# Patient Record
Sex: FEMALE | Race: WHITE | NOT HISPANIC OR LATINO | Employment: UNEMPLOYED | URBAN - METROPOLITAN AREA
[De-identification: names, ages, dates, MRNs, and addresses within clinical notes are randomized per-mention and may not be internally consistent; named-entity substitution may affect disease eponyms.]

---

## 2024-09-29 ENCOUNTER — APPOINTMENT (EMERGENCY)
Dept: CT IMAGING | Facility: HOSPITAL | Age: 39
DRG: 867 | End: 2024-09-29
Payer: COMMERCIAL

## 2024-09-29 ENCOUNTER — HOSPITAL ENCOUNTER (INPATIENT)
Facility: HOSPITAL | Age: 39
LOS: 3 days | Discharge: HOME | DRG: 867 | End: 2024-10-02
Attending: EMERGENCY MEDICINE | Admitting: INTERNAL MEDICINE
Payer: COMMERCIAL

## 2024-09-29 ENCOUNTER — APPOINTMENT (EMERGENCY)
Dept: RADIOLOGY | Facility: HOSPITAL | Age: 39
DRG: 867 | End: 2024-09-29
Payer: COMMERCIAL

## 2024-09-29 DIAGNOSIS — R09.02 HYPOXEMIA REQUIRING SUPPLEMENTAL OXYGEN: ICD-10-CM

## 2024-09-29 DIAGNOSIS — J40 BRONCHITIS: ICD-10-CM

## 2024-09-29 DIAGNOSIS — R07.9 CHEST PAIN, UNSPECIFIED: Primary | ICD-10-CM

## 2024-09-29 DIAGNOSIS — R19.7 DIARRHEA: ICD-10-CM

## 2024-09-29 DIAGNOSIS — J96.01 ACUTE RESPIRATORY FAILURE WITH HYPOXIA (HCC): ICD-10-CM

## 2024-09-29 DIAGNOSIS — Z99.81 HYPOXEMIA REQUIRING SUPPLEMENTAL OXYGEN: ICD-10-CM
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
## 2024-09-29 DIAGNOSIS — J18.9 PNEUMONIA: ICD-10-CM

## 2024-09-29 DIAGNOSIS — R09.02 HYPOXIA: ICD-10-CM

## 2024-09-29 DIAGNOSIS — A69.20 ACUTE LYME DISEASE: ICD-10-CM

## 2024-09-29 DIAGNOSIS — A69.20 LYME DISEASE: ICD-10-CM

## 2024-09-29 PROBLEM — E16.2 HYPOGLYCEMIA: Status: ACTIVE | Noted: 2024-09-29

## 2024-09-29 PROBLEM — E87.6 HYPOKALEMIA: Status: ACTIVE | Noted: 2024-09-29

## 2024-09-29 PROBLEM — F10.20 CONTINUOUS CHRONIC ALCOHOLISM (HCC): Chronic | Status: ACTIVE | Noted: 2024-09-29

## 2024-09-29 PROBLEM — Z72.0 TOBACCO ABUSE: Status: ACTIVE | Noted: 2024-09-29

## 2024-09-29 LAB
2HR DELTA HS TROPONIN: -3 NG/L
4HR DELTA HS TROPONIN: -6 NG/L
ALBUMIN SERPL BCG-MCNC: 3.8 G/DL (ref 3.5–5)
ALP SERPL-CCNC: 56 U/L (ref 34–104)
ALT SERPL W P-5'-P-CCNC: 44 U/L (ref 7–52)
ANION GAP SERPL CALCULATED.3IONS-SCNC: 14 MMOL/L (ref 4–13)
AST SERPL W P-5'-P-CCNC: 64 U/L (ref 13–39)
ATRIAL RATE: 98 BPM
BASOPHILS # BLD AUTO: 0.02 THOUSANDS/ÂΜL (ref 0–0.1)
BASOPHILS NFR BLD AUTO: 0 % (ref 0–1)
BILIRUB SERPL-MCNC: 0.49 MG/DL (ref 0.2–1)
BNP SERPL-MCNC: 65 PG/ML (ref 0–100)
BUN SERPL-MCNC: 9 MG/DL (ref 5–25)
CALCIUM SERPL-MCNC: 8.1 MG/DL (ref 8.4–10.2)
CARDIAC TROPONIN I PNL SERPL HS: 11 NG/L
CARDIAC TROPONIN I PNL SERPL HS: 14 NG/L
CARDIAC TROPONIN I PNL SERPL HS: 17 NG/L
CHLORIDE SERPL-SCNC: 102 MMOL/L (ref 96–108)
CO2 SERPL-SCNC: 23 MMOL/L (ref 21–32)
CREAT SERPL-MCNC: 0.45 MG/DL (ref 0.6–1.3)
D DIMER PPP FEU-MCNC: 0.83 UG/ML FEU
EOSINOPHIL # BLD AUTO: 0 THOUSAND/ÂΜL (ref 0–0.61)
EOSINOPHIL NFR BLD AUTO: 0 % (ref 0–6)
ERYTHROCYTE [DISTWIDTH] IN BLOOD BY AUTOMATED COUNT: 12.5 % (ref 11.6–15.1)
FLUAV AG UPPER RESP QL IA.RAPID: NEGATIVE
FLUBV AG UPPER RESP QL IA.RAPID: NEGATIVE
GFR SERPL CREATININE-BSD FRML MDRD: 125 ML/MIN/1.73SQ M
GLUCOSE SERPL-MCNC: 115 MG/DL (ref 65–140)
GLUCOSE SERPL-MCNC: 65 MG/DL (ref 65–140)
HCG SERPL QL: NEGATIVE
HCT VFR BLD AUTO: 40.4 % (ref 34.8–46.1)
HGB BLD-MCNC: 13.6 G/DL (ref 11.5–15.4)
IMM GRANULOCYTES # BLD AUTO: 0.02 THOUSAND/UL (ref 0–0.2)
IMM GRANULOCYTES NFR BLD AUTO: 0 % (ref 0–2)
LYMPHOCYTES # BLD AUTO: 0.76 THOUSANDS/ÂΜL (ref 0.6–4.47)
LYMPHOCYTES NFR BLD AUTO: 12 % (ref 14–44)
MCH RBC QN AUTO: 31.7 PG (ref 26.8–34.3)
MCHC RBC AUTO-ENTMCNC: 33.7 G/DL (ref 31.4–37.4)
MCV RBC AUTO: 94 FL (ref 82–98)
MONOCYTES # BLD AUTO: 0.57 THOUSAND/ÂΜL (ref 0.17–1.22)
MONOCYTES NFR BLD AUTO: 9 % (ref 4–12)
NEUTROPHILS # BLD AUTO: 5.26 THOUSANDS/ÂΜL (ref 1.85–7.62)
NEUTS SEG NFR BLD AUTO: 79 % (ref 43–75)
NRBC BLD AUTO-RTO: 0 /100 WBCS
P AXIS: 76 DEGREES
PLATELET # BLD AUTO: 166 THOUSANDS/UL (ref 149–390)
PMV BLD AUTO: 8.8 FL (ref 8.9–12.7)
POTASSIUM SERPL-SCNC: 3.4 MMOL/L (ref 3.5–5.3)
PR INTERVAL: 134 MS
PROT SERPL-MCNC: 6.8 G/DL (ref 6.4–8.4)
QRS AXIS: 47 DEGREES
QRSD INTERVAL: 82 MS
QT INTERVAL: 354 MS
QTC INTERVAL: 451 MS
RBC # BLD AUTO: 4.29 MILLION/UL (ref 3.81–5.12)
SARS-COV+SARS-COV-2 AG RESP QL IA.RAPID: NEGATIVE
SODIUM SERPL-SCNC: 139 MMOL/L (ref 135–147)
T WAVE AXIS: 28 DEGREES
VENTRICULAR RATE: 98 BPM
WBC # BLD AUTO: 6.63 THOUSAND/UL (ref 4.31–10.16)

## 2024-09-29 PROCEDURE — 71275 CT ANGIOGRAPHY CHEST: CPT

## 2024-09-29 PROCEDURE — 87804 INFLUENZA ASSAY W/OPTIC: CPT | Performed by: EMERGENCY MEDICINE

## 2024-09-29 PROCEDURE — 36415 COLL VENOUS BLD VENIPUNCTURE: CPT | Performed by: EMERGENCY MEDICINE

## 2024-09-29 PROCEDURE — 84484 ASSAY OF TROPONIN QUANT: CPT | Performed by: EMERGENCY MEDICINE

## 2024-09-29 PROCEDURE — 84703 CHORIONIC GONADOTROPIN ASSAY: CPT | Performed by: EMERGENCY MEDICINE

## 2024-09-29 PROCEDURE — 99285 EMERGENCY DEPT VISIT HI MDM: CPT | Performed by: EMERGENCY MEDICINE

## 2024-09-29 PROCEDURE — 93005 ELECTROCARDIOGRAM TRACING: CPT

## 2024-09-29 PROCEDURE — 86618 LYME DISEASE ANTIBODY: CPT | Performed by: EMERGENCY MEDICINE

## 2024-09-29 PROCEDURE — 85379 FIBRIN DEGRADATION QUANT: CPT | Performed by: EMERGENCY MEDICINE

## 2024-09-29 PROCEDURE — 96365 THER/PROPH/DIAG IV INF INIT: CPT

## 2024-09-29 PROCEDURE — 99285 EMERGENCY DEPT VISIT HI MDM: CPT

## 2024-09-29 PROCEDURE — 93010 ELECTROCARDIOGRAM REPORT: CPT | Performed by: INTERNAL MEDICINE

## 2024-09-29 PROCEDURE — 86617 LYME DISEASE ANTIBODY: CPT | Performed by: EMERGENCY MEDICINE

## 2024-09-29 PROCEDURE — 99223 1ST HOSP IP/OBS HIGH 75: CPT | Performed by: INTERNAL MEDICINE

## 2024-09-29 PROCEDURE — 80053 COMPREHEN METABOLIC PANEL: CPT | Performed by: EMERGENCY MEDICINE

## 2024-09-29 PROCEDURE — 71046 X-RAY EXAM CHEST 2 VIEWS: CPT

## 2024-09-29 PROCEDURE — 85025 COMPLETE CBC W/AUTO DIFF WBC: CPT | Performed by: EMERGENCY MEDICINE

## 2024-09-29 PROCEDURE — 87040 BLOOD CULTURE FOR BACTERIA: CPT | Performed by: INTERNAL MEDICINE

## 2024-09-29 PROCEDURE — 82948 REAGENT STRIP/BLOOD GLUCOSE: CPT

## 2024-09-29 PROCEDURE — 87811 SARS-COV-2 COVID19 W/OPTIC: CPT | Performed by: EMERGENCY MEDICINE

## 2024-09-29 PROCEDURE — 83880 ASSAY OF NATRIURETIC PEPTIDE: CPT | Performed by: EMERGENCY MEDICINE

## 2024-09-29 RX ORDER — MAGNESIUM SULFATE HEPTAHYDRATE 40 MG/ML
2 INJECTION, SOLUTION INTRAVENOUS ONCE
Status: COMPLETED | OUTPATIENT
Start: 2024-09-29 | End: 2024-09-30

## 2024-09-29 RX ORDER — FOLIC ACID 1 MG/1
1 TABLET ORAL DAILY
Status: DISCONTINUED | OUTPATIENT
Start: 2024-09-30 | End: 2024-10-02 | Stop reason: HOSPADM

## 2024-09-29 RX ORDER — BENZONATATE 100 MG/1
100 CAPSULE ORAL 3 TIMES DAILY PRN
Status: DISCONTINUED | OUTPATIENT
Start: 2024-09-29 | End: 2024-10-02 | Stop reason: HOSPADM

## 2024-09-29 RX ORDER — LORAZEPAM 2 MG/ML
2 INJECTION INTRAMUSCULAR EVERY 4 HOURS PRN
Status: DISCONTINUED | OUTPATIENT
Start: 2024-09-29 | End: 2024-10-02 | Stop reason: HOSPADM

## 2024-09-29 RX ORDER — CHLORDIAZEPOXIDE HYDROCHLORIDE 25 MG/1
25 CAPSULE, GELATIN COATED ORAL EVERY 6 HOURS SCHEDULED
Status: DISCONTINUED | OUTPATIENT
Start: 2024-09-29 | End: 2024-10-02 | Stop reason: HOSPADM

## 2024-09-29 RX ORDER — LANOLIN ALCOHOL/MO/W.PET/CERES
100 CREAM (GRAM) TOPICAL DAILY
Status: DISCONTINUED | OUTPATIENT
Start: 2024-09-30 | End: 2024-10-02 | Stop reason: HOSPADM

## 2024-09-29 RX ORDER — MAGNESIUM HYDROXIDE/ALUMINUM HYDROXICE/SIMETHICONE 120; 1200; 1200 MG/30ML; MG/30ML; MG/30ML
30 SUSPENSION ORAL ONCE
Status: COMPLETED | OUTPATIENT
Start: 2024-09-29 | End: 2024-09-29

## 2024-09-29 RX ORDER — ONDANSETRON 2 MG/ML
1 INJECTION INTRAMUSCULAR; INTRAVENOUS ONCE
Status: COMPLETED | OUTPATIENT
Start: 2024-09-29 | End: 2024-09-29

## 2024-09-29 RX ORDER — DEXTROSE MONOHYDRATE, SODIUM CHLORIDE, AND POTASSIUM CHLORIDE 50; 2.98; 4.5 G/1000ML; G/1000ML; G/1000ML
125 INJECTION, SOLUTION INTRAVENOUS CONTINUOUS
Status: DISPENSED | OUTPATIENT
Start: 2024-09-29 | End: 2024-09-30

## 2024-09-29 RX ORDER — SODIUM CHLORIDE AND POTASSIUM CHLORIDE 300; 900 MG/100ML; MG/100ML
125 INJECTION, SOLUTION INTRAVENOUS CONTINUOUS
Status: DISCONTINUED | OUTPATIENT
Start: 2024-09-29 | End: 2024-09-29

## 2024-09-29 RX ORDER — GUAIFENESIN 600 MG/1
600 TABLET, EXTENDED RELEASE ORAL EVERY 12 HOURS SCHEDULED
Status: DISCONTINUED | OUTPATIENT
Start: 2024-09-29 | End: 2024-10-02 | Stop reason: HOSPADM

## 2024-09-29 RX ORDER — LORAZEPAM 1 MG/1
1 TABLET ORAL ONCE
Status: COMPLETED | OUTPATIENT
Start: 2024-09-29 | End: 2024-09-29

## 2024-09-29 RX ORDER — MAGNESIUM HYDROXIDE/ALUMINUM HYDROXICE/SIMETHICONE 120; 1200; 1200 MG/30ML; MG/30ML; MG/30ML
30 SUSPENSION ORAL EVERY 4 HOURS PRN
Status: DISCONTINUED | OUTPATIENT
Start: 2024-09-29 | End: 2024-10-01

## 2024-09-29 RX ORDER — ALBUTEROL SULFATE 0.83 MG/ML
2.5 SOLUTION RESPIRATORY (INHALATION) EVERY 4 HOURS PRN
Status: DISCONTINUED | OUTPATIENT
Start: 2024-09-29 | End: 2024-10-02 | Stop reason: HOSPADM

## 2024-09-29 RX ADMIN — GUAIFENESIN 600 MG: 600 TABLET ORAL at 21:35

## 2024-09-29 RX ADMIN — CEFTRIAXONE SODIUM 1000 MG: 10 INJECTION, POWDER, FOR SOLUTION INTRAVENOUS at 21:07

## 2024-09-29 RX ADMIN — MAGNESIUM SULFATE HEPTAHYDRATE 2 G: 40 INJECTION, SOLUTION INTRAVENOUS at 23:47

## 2024-09-29 RX ADMIN — AMPICILLIN SODIUM AND SULBACTAM SODIUM 3 G: 100; 50 INJECTION, POWDER, FOR SOLUTION INTRAVENOUS at 23:43

## 2024-09-29 RX ADMIN — DEXTROSE, SODIUM CHLORIDE, AND POTASSIUM CHLORIDE 125 ML/HR: 5; .45; .3 INJECTION INTRAVENOUS at 21:30

## 2024-09-29 RX ADMIN — LORAZEPAM 1 MG: 1 TABLET ORAL at 16:27

## 2024-09-29 RX ADMIN — ALUMINUM HYDROXIDE, MAGNESIUM HYDROXIDE, AND DIMETHICONE 30 ML: 200; 20; 200 SUSPENSION ORAL at 16:26

## 2024-09-29 RX ADMIN — THIAMINE HYDROCHLORIDE 100 MG: 100 INJECTION, SOLUTION INTRAMUSCULAR; INTRAVENOUS at 23:40

## 2024-09-29 RX ADMIN — IOHEXOL 100 ML: 350 INJECTION, SOLUTION INTRAVENOUS at 19:11

## 2024-09-29 RX ADMIN — AZITHROMYCIN MONOHYDRATE 500 MG: 500 INJECTION, POWDER, LYOPHILIZED, FOR SOLUTION INTRAVENOUS at 20:07

## 2024-09-29 RX ADMIN — CHLORDIAZEPOXIDE HYDROCHLORIDE 25 MG: 25 CAPSULE ORAL at 23:39

## 2024-09-29 NOTE — ED PROVIDER NOTES
Final diagnoses:   Chest pain, unspecified   Pneumonia   Hypoxia     ED Disposition       ED Disposition   Admit    Condition   Stable    Date/Time   Sun Sep 29, 2024  8:32 PM    Comment                  Assessment & Plan       Medical Decision Making  Patient presented complaining of chest pain with shortness of breath.  Initially it seemed like it might be anxiety.  Patient had complained of paresthesias to all her extremities and feeling tremulous.  However, patient was noted to be hypoxic.  Troponin was minimally elevated.  However delta troponin was negative.  EKG was without ischemia.  This is not acute coronary syndrome.  Patient underwent CAT scan which was negative for pulmonary embolism.  It did show infection, probably pneumonia.  There was no pneumothorax.  No dissection.  Nothing on EKG or troponin to suggest myocarditis.  COVID was negative.  There is no congestive heart failure.  As patient is hypoxic, consulted with hospitalist for admission.  Patient was cultured and received empiric antibiotics.    Amount and/or Complexity of Data Reviewed  Labs: ordered. Decision-making details documented in ED Course.  Radiology: ordered and independent interpretation performed. Decision-making details documented in ED Course.  ECG/medicine tests: ordered and independent interpretation performed. Decision-making details documented in ED Course.  Discussion of management or test interpretation with external provider(s): Hospitalist    Risk  OTC drugs.  Prescription drug management.  Decision regarding hospitalization.             Medications   ceftriaxone (ROCEPHIN) 1 g/50 mL in dextrose IVPB (has no administration in time range)   azithromycin (ZITHROMAX) 500 mg in sodium chloride 0.9% 250mL IVPB 500 mg (500 mg Intravenous New Bag 9/29/24 2007)   guaiFENesin (MUCINEX) 12 hr tablet 600 mg (has no administration in time range)   benzonatate (TESSALON PERLES) capsule 100 mg (has no administration in time range)    albuterol inhalation solution 2.5 mg (has no administration in time range)   azithromycin (ZITHROMAX) 500 mg in sodium chloride 0.9% 250mL IVPB 500 mg (has no administration in time range)   dextrose 5 % and sodium chloride 0.45 % with KCl 40 mEq/L infusion (premix) (has no administration in time range)   ceftriaxone (ROCEPHIN) 1 g/50 mL in dextrose IVPB (has no administration in time range)   ondansetron (FOR EMS ONLY) (ZOFRAN) 4 mg/2 mL injection 4 mg (0 mg Does not apply Given to EMS 24 162)   LORazepam (ATIVAN) tablet 1 mg (1 mg Oral Given 24 162)   aluminum-magnesium hydroxide-simethicone (MAALOX) oral suspension 30 mL (30 mL Oral Given 24 162)   iohexol (OMNIPAQUE) 350 MG/ML injection (MULTI-DOSE) 100 mL (100 mL Intravenous Given 24 191)       ED Risk Strat Scores                                               History of Present Illness       Chief Complaint   Patient presents with    Chest Pain     Pt arrives via EMS c/o left sided chest pain, SOB, dizziness, and nausea that began around 1130, pt reports having a tick bite and large bullseye on her back 3 weeks ago, was not treated at that time. EMS had BG=55 on scene, gave D10 en route, VU=293 in triage       History reviewed. No pertinent past medical history.   Past Surgical History:   Procedure Laterality Date    ADENOIDECTOMY       SECTION  2014    TONSILLECTOMY        History reviewed. No pertinent family history.   Social History     Tobacco Use    Smoking status: Every Day     Current packs/day: 0.50     Types: Cigarettes    Smokeless tobacco: Current   Substance Use Topics    Alcohol use: Not Currently     Comment: pt reports 8 white claws a day    Drug use: Never      E-Cigarette/Vaping      E-Cigarette/Vaping Substances      I have reviewed and agree with the history as documented.     Patient is a 39-year-old female.  There is no personal history of coronary artery disease or thromboembolic disease.  Cardiac risk  factors include hypertension and smoking.  No history of hyperlipidemia or diabetes.  No family history of early coronary artery disease and first-degree relatives.  Today she developed chest tightness and shortness of breath.  It was associated with tingling to all her extremities.  She also was shaky.  EMS found her blood sugar to be low at 55.  Improved with D10 administration.  Patient does feel improved but continues to experience chest tightness.  Her symptoms began around 1130.  Earlier this week she was sick with nausea vomiting and diarrhea.  She later developed cough and congestion.  Those symptoms improved.  No fever.  No hemoptysis.  No calf pain or unilateral leg swelling.  He does report some nausea.  No diaphoresis.  In addition patient reports tick bite about a month ago.        Review of Systems   Constitutional:  Negative for chills and fever.   HENT:  Positive for congestion. Negative for rhinorrhea and sore throat.    Eyes:  Negative for pain, redness and visual disturbance.   Respiratory:  Positive for cough and shortness of breath.    Cardiovascular:  Positive for chest pain. Negative for leg swelling.   Gastrointestinal:  Positive for diarrhea and vomiting. Negative for abdominal pain.   Endocrine: Negative for polydipsia and polyuria.   Genitourinary:  Negative for dysuria, frequency, hematuria, vaginal bleeding and vaginal discharge.   Musculoskeletal:  Negative for back pain and neck pain.   Skin:  Negative for rash and wound.   Allergic/Immunologic: Negative for immunocompromised state.   Neurological:  Positive for tremors and numbness. Negative for weakness and headaches.   Hematological:  Does not bruise/bleed easily.   Psychiatric/Behavioral:  Negative for hallucinations and suicidal ideas.    All other systems reviewed and are negative.          Objective       ED Triage Vitals   Temperature Pulse Blood Pressure Respirations SpO2 Patient Position - Orthostatic VS   09/29/24 1344  09/29/24 1344 09/29/24 1344 09/29/24 1344 09/29/24 1344 09/29/24 1630   97.6 °F (36.4 °C) 97 158/76 18 91 % Lying      Temp Source Heart Rate Source BP Location FiO2 (%) Pain Score    09/29/24 1344 09/29/24 1344 09/29/24 1630 -- --    Oral Monitor Right arm        Vitals      Date and Time Temp Pulse SpO2 Resp BP Pain Score FACES Pain Rating User   09/29/24 1930 -- 94 93 % 15 127/72 -- --    09/29/24 1730 -- 108 89 % 22 172/82 -- -- AA   09/29/24 1700 -- 88 93 % 18 126/71 -- --    09/29/24 1630 -- 93 90 % 17 138/78 -- --    09/29/24 1344 97.6 °F (36.4 °C) 97 91 % 18 158/76 -- -- KM            Physical Exam  Vitals reviewed.   Constitutional:       General: She is not in acute distress.  HENT:      Head: Normocephalic and atraumatic.      Nose: Nose normal.      Mouth/Throat:      Mouth: Mucous membranes are moist.   Eyes:      General:         Right eye: No discharge.         Left eye: No discharge.      Conjunctiva/sclera: Conjunctivae normal.   Cardiovascular:      Rate and Rhythm: Normal rate and regular rhythm.      Pulses: Normal pulses.      Heart sounds: Normal heart sounds. No murmur heard.     No friction rub. No gallop.   Pulmonary:      Effort: Pulmonary effort is normal. No respiratory distress.      Breath sounds: Normal breath sounds. No stridor. No wheezing, rhonchi or rales.   Abdominal:      General: Bowel sounds are normal. There is no distension.      Palpations: Abdomen is soft.      Tenderness: There is no abdominal tenderness. There is no right CVA tenderness, left CVA tenderness, guarding or rebound.   Musculoskeletal:         General: No swelling, tenderness, deformity or signs of injury. Normal range of motion.      Cervical back: Normal range of motion and neck supple. No rigidity.      Right lower leg: No tenderness. No edema.      Left lower leg: No tenderness. No edema.      Comments: No calf tenderness or unilateral leg swelling.   Skin:     General: Skin is warm and dry.       Coloration: Skin is not jaundiced.      Findings: No rash.   Neurological:      General: No focal deficit present.      Mental Status: She is alert and oriented to person, place, and time.      Sensory: No sensory deficit.      Motor: Motor function is intact.   Psychiatric:         Mood and Affect: Mood normal.         Behavior: Behavior normal.         Results Reviewed       Procedure Component Value Units Date/Time    Blood culture #1 [[id number]]     Lab Status: No result Specimen: Blood     Blood culture #2 [[id number]]     Lab Status: No result Specimen: Blood     HS Troponin I 4hr [[id number]]  (Normal) Collected: 09/29/24 2003    Lab Status: Final result Specimen: Blood from Arm, Right Updated: 09/29/24 2030     hs TnI 4hr 11 ng/L      Delta 4hr hsTnI -6 ng/L     Sputum culture and Gram stain [[id number]]     Lab Status: No result Specimen: Sputum     HS Troponin I 2hr [[id number]]  (Normal) Collected: 09/29/24 1750    Lab Status: Final result Specimen: Blood from Arm, Left Updated: 09/29/24 1832     hs TnI 2hr 14 ng/L      Delta 2hr hsTnI -3 ng/L     B-Type Natriuretic Peptide(BNP) [[id number]]  (Normal) Collected: 09/29/24 1750    Lab Status: Final result Specimen: Blood from Arm, Left Updated: 09/29/24 1830     BNP 65 pg/mL     FLU/COVID Rapid Antigen (30 min. TAT) - Preferred screening test in ED [[id number]]  (Normal) Collected: 09/29/24 1750    Lab Status: Final result Specimen: Nares from Nose Updated: 09/29/24 1814     SARS COV Rapid Antigen Negative     Influenza A Rapid Antigen Negative     Influenza B Rapid Antigen Negative    Narrative:      This test has been performed using the Quidel Kamilla 2 FLU+SARS Antigen test under the Emergency Use Authorization (EUA). This test has been validated by the  and verified by the performing laboratory. The Kamilla uses lateral flow immunofluorescent sandwich assay to detect SARS-COV, Influenza A and Influenza B Antigen.     The Quidel Kamilla 2 SARS Antigen  test does not differentiate between SARS-CoV and SARS-CoV-2.     Negative results are presumptive and may be confirmed with a molecular assay, if necessary, for patient management. Negative results do not rule out SARS-CoV-2 or influenza infection and should not be used as the sole basis for treatment or patient management decisions. A negative test result may occur if the level of antigen in a sample is below the limit of detection of this test.     Positive results are indicative of the presence of viral antigens, but do not rule out bacterial infection or co-infection with other viruses.     All test results should be used as an adjunct to clinical observations and other information available to the provider.    FOR PEDIATRIC PATIENTS - copy/paste COVID Guidelines URL to browser: [email or web address]    hCG, qualitative pregnancy [[id number]]  (Normal) Collected: 09/29/24 1550    Lab Status: Final result Specimen: Blood from Arm, Left Updated: 09/29/24 1653     Preg, Serum Negative    HS Troponin 0hr (reflex protocol) [[id number]]  (Normal) Collected: 09/29/24 1550    Lab Status: Final result Specimen: Blood from Arm, Left Updated: 09/29/24 1646     hs TnI 0hr 17 ng/L     Comprehensive metabolic panel [[id number]]  (Abnormal) Collected: 09/29/24 1550    Lab Status: Final result Specimen: Blood from Arm, Left Updated: 09/29/24 1625     Sodium 139 mmol/L      Potassium 3.4 mmol/L      Chloride 102 mmol/L      CO2 23 mmol/L      ANION GAP 14 mmol/L      BUN 9 mg/dL      Creatinine 0.45 mg/dL      Glucose 65 mg/dL      Calcium 8.1 mg/dL      AST 64 U/L      ALT 44 U/L      Alkaline Phosphatase 56 U/L      Total Protein 6.8 g/dL      Albumin 3.8 g/dL      Total Bilirubin 0.49 mg/dL      eGFR 125 ml/min/1.73sq m     Narrative:      [employer] guidelines for Chronic Kidney Disease (CKD):     Stage 1 with normal or high GFR (GFR > 90 mL/min/1.73  square meters)    Stage 2 Mild CKD (GFR = 60-89 mL/min/1.73 square meters)    Stage 3A Moderate CKD (GFR = 45-59 mL/min/1.73 square meters)    Stage 3B Moderate CKD (GFR = 30-44 mL/min/1.73 square meters)    Stage 4 Severe CKD (GFR = 15-29 mL/min/1.73 square meters)    Stage 5 End Stage CKD (GFR <15 mL/min/1.73 square meters)  Note: GFR calculation is accurate only with a steady state creatinine    D-Dimer [[id number]]  (Abnormal) Collected: 09/29/24 1550    Lab Status: Final result Specimen: Blood from Arm, Left Updated: 09/29/24 1620     D-Dimer, Quant 0.83 ug/ml FEU     CBC and differential [[id number]]  (Abnormal) Collected: 09/29/24 1550    Lab Status: Final result Specimen: Blood from Arm, Left Updated: 09/29/24 1601     WBC 6.63 Thousand/uL      RBC 4.29 Million/uL      Hemoglobin 13.6 g/dL      Hematocrit 40.4 %      MCV 94 fL      MCH 31.7 pg      MCHC 33.7 g/dL      RDW 12.5 %      MPV 8.8 fL      Platelets 166 Thousands/uL      nRBC 0 /100 WBCs      Segmented % 79 %      Immature Grans % 0 %      Lymphocytes % 12 %      Monocytes % 9 %      Eosinophils Relative 0 %      Basophils Relative 0 %      Absolute Neutrophils 5.26 Thousands/µL      Absolute Immature Grans 0.02 Thousand/uL      Absolute Lymphocytes 0.76 Thousands/µL      Absolute Monocytes 0.57 Thousand/µL      Eosinophils Absolute 0.00 Thousand/µL      Basophils Absolute 0.02 Thousands/µL     LYME TOTAL AB W REFLEX TO IGM/IGG [[id number]] Collected: 09/29/24 1550    Lab Status: In process Specimen: Blood from Arm, Left Updated: 09/29/24 1558    Narrative:      The following orders were created for panel order LYME TOTAL AB W REFLEX TO IGM/IGG.  Procedure                               Abnormality         Status                     ---------                               -----------         ------                     Lyme Total AB W Reflex t...[[id number]]                      In process                   Please view results for these tests on the  individual orders.    Lyme Total AB W Reflex to IGM/IGG [[id number]] Collected: 09/29/24 1550    Lab Status: In process Specimen: Blood from Arm, Left Updated: 09/29/24 1558    Fingerstick Glucose (POCT) [[id number]]  (Normal) Collected: 09/29/24 1345    Lab Status: Final result Specimen: Blood Updated: 09/29/24 1346     POC Glucose 115 mg/dl             CTA ED chest PE study   Final Interpretation by Maurilio Vazquez MD (09/29 1947)      1. No evidence of pulmonary embolism.   2. Partial opacification of the bilateral lower lobe bronchi with bronchial wall thickening, associated tree-in-bud opacities and centrilobular nodules. Findings concerning for infectious/inflammatory bronchial airways disease.      The study was marked in EPIC for immediate notification.                     Workstation performed: [id number]         XR chest 2 views   ED Interpretation by Jackson Ontiveros MD (09/29 1524)   No infiltrates.  No CHF.      Final Interpretation by Farshad Maxwell MD (09/29 1849)      No acute cardiopulmonary disease.            Workstation performed: [id number]             ECG 12 Lead Documentation Only    Date/Time: 9/29/2024 2:23 PM    Performed by: Jackson Ontiveros MD  Authorized by: Jackson Ontiveros MD    ECG reviewed by me, the ED Provider: yes    Patient location:  ED  Comments:      Normal sinus rhythm with sinus arrhythmia.  No acute ischemic ST or T wave changes.  No ectopy.  Normal EKG.      ED Medication and Procedure Management   None     Patient's Medications    No medications on file     No discharge procedures on file.  ED SEPSIS DOCUMENTATION   Time reflects when diagnosis was documented in both MDM as applicable and the Disposition within this note       Time User Action Codes Description Comment    9/29/2024  8:32 PM Jackson Ontiveros Add [R07.9] Chest pain, unspecified     9/29/2024  8:32 PM Jackson Ontiveros Add [J18.9] Pneumonia     9/29/2024  8:32 PM Jackson Ontiveros Add [R09.02] Hypoxia                   Jackson Ontiveros MD  09/29/24 2033

## 2024-09-29 NOTE — ED NOTES
Pt ambulated around unit without O2, SpO2 84%, pt endorses increased dizziness and SOB     Jinny Slade RN  09/29/24 5622

## 2024-09-30 LAB
ALBUMIN SERPL BCG-MCNC: 3.7 G/DL (ref 3.5–5)
ALP SERPL-CCNC: 55 U/L (ref 34–104)
ALT SERPL W P-5'-P-CCNC: 34 U/L (ref 7–52)
ANION GAP SERPL CALCULATED.3IONS-SCNC: 10 MMOL/L (ref 4–13)
AST SERPL W P-5'-P-CCNC: 43 U/L (ref 13–39)
B BURGDOR IGG SERPL QL IA: POSITIVE
B BURGDOR IGG SERPL QL IA: POSITIVE
B BURGDOR IGG+IGM SER QL IA: POSITIVE
B BURGDOR IGG+IGM SER QL IA: POSITIVE
B BURGDOR IGM SERPL QL IA: POSITIVE
B BURGDOR IGM SERPL QL IA: POSITIVE
BASOPHILS # BLD AUTO: 0.03 THOUSANDS/ÂΜL (ref 0–0.1)
BASOPHILS NFR BLD AUTO: 0 % (ref 0–1)
BILIRUB SERPL-MCNC: 0.59 MG/DL (ref 0.2–1)
BUN SERPL-MCNC: 10 MG/DL (ref 5–25)
CALCIUM SERPL-MCNC: 8.6 MG/DL (ref 8.4–10.2)
CHLORIDE SERPL-SCNC: 97 MMOL/L (ref 96–108)
CO2 SERPL-SCNC: 28 MMOL/L (ref 21–32)
CREAT SERPL-MCNC: 0.61 MG/DL (ref 0.6–1.3)
EOSINOPHIL # BLD AUTO: 0.03 THOUSAND/ÂΜL (ref 0–0.61)
EOSINOPHIL NFR BLD AUTO: 0 % (ref 0–6)
ERYTHROCYTE [DISTWIDTH] IN BLOOD BY AUTOMATED COUNT: 12.4 % (ref 11.6–15.1)
GFR SERPL CREATININE-BSD FRML MDRD: 113 ML/MIN/1.73SQ M
GLUCOSE SERPL-MCNC: 127 MG/DL (ref 65–140)
HCT VFR BLD AUTO: 42.7 % (ref 34.8–46.1)
HGB BLD-MCNC: 14.2 G/DL (ref 11.5–15.4)
IMM GRANULOCYTES # BLD AUTO: 0.01 THOUSAND/UL (ref 0–0.2)
IMM GRANULOCYTES NFR BLD AUTO: 0 % (ref 0–2)
INSULIN SERPL-ACNC: 4.75 UIU/ML (ref 1.9–23)
LACTATE SERPL-SCNC: 1.3 MMOL/L (ref 0.5–2)
LYMPHOCYTES # BLD AUTO: 1.31 THOUSANDS/ÂΜL (ref 0.6–4.47)
LYMPHOCYTES NFR BLD AUTO: 19 % (ref 14–44)
MAGNESIUM SERPL-MCNC: 2.1 MG/DL (ref 1.9–2.7)
MCH RBC QN AUTO: 31.7 PG (ref 26.8–34.3)
MCHC RBC AUTO-ENTMCNC: 33.3 G/DL (ref 31.4–37.4)
MCV RBC AUTO: 95 FL (ref 82–98)
MONOCYTES # BLD AUTO: 1.03 THOUSAND/ÂΜL (ref 0.17–1.22)
MONOCYTES NFR BLD AUTO: 15 % (ref 4–12)
NEUTROPHILS # BLD AUTO: 4.64 THOUSANDS/ÂΜL (ref 1.85–7.62)
NEUTS SEG NFR BLD AUTO: 66 % (ref 43–75)
NRBC BLD AUTO-RTO: 0 /100 WBCS
PHOSPHATE SERPL-MCNC: 1.7 MG/DL (ref 2.7–4.5)
PLATELET # BLD AUTO: 185 THOUSANDS/UL (ref 149–390)
PMV BLD AUTO: 8.9 FL (ref 8.9–12.7)
POTASSIUM SERPL-SCNC: 4.1 MMOL/L (ref 3.5–5.3)
PROCALCITONIN SERPL-MCNC: 0.11 NG/ML
PROCALCITONIN SERPL-MCNC: 0.12 NG/ML
PROT SERPL-MCNC: 6.6 G/DL (ref 6.4–8.4)
RBC # BLD AUTO: 4.48 MILLION/UL (ref 3.81–5.12)
SODIUM SERPL-SCNC: 135 MMOL/L (ref 135–147)
TSH SERPL DL<=0.05 MIU/L-ACNC: 1.17 UIU/ML (ref 0.45–4.5)
WBC # BLD AUTO: 7.05 THOUSAND/UL (ref 4.31–10.16)

## 2024-09-30 PROCEDURE — 84206 ASSAY OF PROINSULIN: CPT | Performed by: INTERNAL MEDICINE

## 2024-09-30 PROCEDURE — 86618 LYME DISEASE ANTIBODY: CPT | Performed by: INTERNAL MEDICINE

## 2024-09-30 PROCEDURE — 85025 COMPLETE CBC W/AUTO DIFF WBC: CPT | Performed by: INTERNAL MEDICINE

## 2024-09-30 PROCEDURE — 84100 ASSAY OF PHOSPHORUS: CPT | Performed by: INTERNAL MEDICINE

## 2024-09-30 PROCEDURE — 84145 PROCALCITONIN (PCT): CPT | Performed by: NURSE PRACTITIONER

## 2024-09-30 PROCEDURE — 84443 ASSAY THYROID STIM HORMONE: CPT | Performed by: INTERNAL MEDICINE

## 2024-09-30 PROCEDURE — 84681 ASSAY OF C-PEPTIDE: CPT | Performed by: INTERNAL MEDICINE

## 2024-09-30 PROCEDURE — 84145 PROCALCITONIN (PCT): CPT | Performed by: INTERNAL MEDICINE

## 2024-09-30 PROCEDURE — 94664 DEMO&/EVAL PT USE INHALER: CPT

## 2024-09-30 PROCEDURE — 83735 ASSAY OF MAGNESIUM: CPT | Performed by: INTERNAL MEDICINE

## 2024-09-30 PROCEDURE — 94760 N-INVAS EAR/PLS OXIMETRY 1: CPT

## 2024-09-30 PROCEDURE — 99232 SBSQ HOSP IP/OBS MODERATE 35: CPT | Performed by: NURSE PRACTITIONER

## 2024-09-30 PROCEDURE — 80053 COMPREHEN METABOLIC PANEL: CPT | Performed by: INTERNAL MEDICINE

## 2024-09-30 PROCEDURE — 83525 ASSAY OF INSULIN: CPT | Performed by: INTERNAL MEDICINE

## 2024-09-30 PROCEDURE — 86617 LYME DISEASE ANTIBODY: CPT | Performed by: INTERNAL MEDICINE

## 2024-09-30 PROCEDURE — 83605 ASSAY OF LACTIC ACID: CPT | Performed by: INTERNAL MEDICINE

## 2024-09-30 RX ORDER — ENOXAPARIN SODIUM 100 MG/ML
40 INJECTION SUBCUTANEOUS DAILY
Status: DISCONTINUED | OUTPATIENT
Start: 2024-09-30 | End: 2024-10-02 | Stop reason: HOSPADM

## 2024-09-30 RX ORDER — ACETAMINOPHEN 325 MG/1
650 TABLET ORAL EVERY 6 HOURS PRN
Status: DISCONTINUED | OUTPATIENT
Start: 2024-09-30 | End: 2024-10-02 | Stop reason: HOSPADM

## 2024-09-30 RX ORDER — ONDANSETRON 2 MG/ML
4 INJECTION INTRAMUSCULAR; INTRAVENOUS EVERY 6 HOURS PRN
Status: DISCONTINUED | OUTPATIENT
Start: 2024-09-30 | End: 2024-10-02 | Stop reason: HOSPADM

## 2024-09-30 RX ORDER — METHYLPREDNISOLONE SODIUM SUCCINATE 40 MG/ML
40 INJECTION, POWDER, LYOPHILIZED, FOR SOLUTION INTRAMUSCULAR; INTRAVENOUS ONCE
Status: COMPLETED | OUTPATIENT
Start: 2024-09-30 | End: 2024-09-30

## 2024-09-30 RX ORDER — POLYETHYLENE GLYCOL 3350 17 G/17G
17 POWDER, FOR SOLUTION ORAL DAILY PRN
Status: DISCONTINUED | OUTPATIENT
Start: 2024-09-30 | End: 2024-10-02 | Stop reason: HOSPADM

## 2024-09-30 RX ORDER — NICOTINE 21 MG/24HR
1 PATCH, TRANSDERMAL 24 HOURS TRANSDERMAL DAILY
Status: DISCONTINUED | OUTPATIENT
Start: 2024-09-30 | End: 2024-10-02 | Stop reason: HOSPADM

## 2024-09-30 RX ORDER — HYDROMORPHONE HCL IN WATER/PF 6 MG/30 ML
0.2 PATIENT CONTROLLED ANALGESIA SYRINGE INTRAVENOUS ONCE
Status: COMPLETED | OUTPATIENT
Start: 2024-09-30 | End: 2024-09-30

## 2024-09-30 RX ADMIN — BENZONATATE 100 MG: 100 CAPSULE ORAL at 22:29

## 2024-09-30 RX ADMIN — DOXYCYCLINE 100 MG: 100 INJECTION, POWDER, LYOPHILIZED, FOR SOLUTION INTRAVENOUS at 00:32

## 2024-09-30 RX ADMIN — CHLORDIAZEPOXIDE HYDROCHLORIDE 25 MG: 25 CAPSULE ORAL at 11:58

## 2024-09-30 RX ADMIN — FOLIC ACID 1 MG: 1 TABLET ORAL at 09:22

## 2024-09-30 RX ADMIN — AMPICILLIN SODIUM AND SULBACTAM SODIUM 3 G: 100; 50 INJECTION, POWDER, FOR SOLUTION INTRAVENOUS at 05:47

## 2024-09-30 RX ADMIN — ACETAMINOPHEN 650 MG: 325 TABLET ORAL at 22:29

## 2024-09-30 RX ADMIN — CHLORDIAZEPOXIDE HYDROCHLORIDE 25 MG: 25 CAPSULE ORAL at 23:49

## 2024-09-30 RX ADMIN — HYDROMORPHONE HYDROCHLORIDE 0.2 MG: 0.2 INJECTION, SOLUTION INTRAMUSCULAR; INTRAVENOUS; SUBCUTANEOUS at 17:30

## 2024-09-30 RX ADMIN — DOXYCYCLINE 100 MG: 100 INJECTION, POWDER, LYOPHILIZED, FOR SOLUTION INTRAVENOUS at 11:58

## 2024-09-30 RX ADMIN — ACETAMINOPHEN 650 MG: 325 TABLET ORAL at 05:01

## 2024-09-30 RX ADMIN — B-COMPLEX W/ C & FOLIC ACID TAB 1 TABLET: TAB at 09:22

## 2024-09-30 RX ADMIN — NICOTINE 1 PATCH: 14 PATCH, EXTENDED RELEASE TRANSDERMAL at 09:20

## 2024-09-30 RX ADMIN — GUAIFENESIN 600 MG: 600 TABLET ORAL at 22:29

## 2024-09-30 RX ADMIN — GUAIFENESIN 600 MG: 600 TABLET ORAL at 09:22

## 2024-09-30 RX ADMIN — METHYLPREDNISOLONE SODIUM SUCCINATE 40 MG: 40 INJECTION, POWDER, FOR SOLUTION INTRAMUSCULAR; INTRAVENOUS at 12:58

## 2024-09-30 RX ADMIN — THIAMINE HCL TAB 100 MG 100 MG: 100 TAB at 09:22

## 2024-09-30 RX ADMIN — CHLORDIAZEPOXIDE HYDROCHLORIDE 25 MG: 25 CAPSULE ORAL at 17:26

## 2024-09-30 RX ADMIN — ENOXAPARIN SODIUM 40 MG: 40 INJECTION SUBCUTANEOUS at 09:23

## 2024-09-30 RX ADMIN — CHLORDIAZEPOXIDE HYDROCHLORIDE 25 MG: 25 CAPSULE ORAL at 05:01

## 2024-09-30 RX ADMIN — DOXYCYCLINE 100 MG: 100 INJECTION, POWDER, LYOPHILIZED, FOR SOLUTION INTRAVENOUS at 22:29

## 2024-09-30 NOTE — UTILIZATION REVIEW
Initial Clinical Review    Admission: Date/Time/Statement:   Admission Orders (From admission, onward)       Ordered        09/29/24 2033  INPATIENT ADMISSION  Once                           Inpatient Admission     Standing Status:   Standing     Number of Occurrences:   1     Order Specific Question:   Level of Care     Answer:   Med Surg [16]     Order Specific Question:   Estimated length of stay     Answer:   More than 2 Midnights     Order Specific Question:   Certification     Answer:   I certify that inpatient services are medically necessary for this patient for a duration of greater than two midnights. See H&P and MD Progress Notes for additional information about the patient's course of treatment.     ED Arrival Information       Expected   -    Arrival   9/29/2024 13:36    Acuity   Emergent              Means of arrival   Ambulance    Escorted by   [facility]    Service   Hospitalist    Admission type   Emergency              Arrival complaint   CHEST PAIN             Chief Complaint   Patient presents with    Chest Pain     Pt arrives via EMS c/o left sided chest pain, SOB, dizziness, and nausea that began around 1130, pt reports having a tick bite and large bullseye on her back 3 weeks ago, was not treated at that time. EMS had BG=55 on scene, gave D10 en route, EF=632 in triage       Initial Presentation: 39 y.o. female presents to ed from home on 9-29 for evaluation and treatment of L chest pain, shortness of breath, dizziness, nausea. Patient reports a a tick bite and large bulls eye 3 weeks ago.  Not treated at the time.  Blood glucose 55.  Received D10 and iv zofran en route.  PMHX: not on home O2,  Alcoholism, DM, HTN- No cardiac history.  Clinical assessment significant for tachycardia, hypoxia, hypertension.  K 3.4, AN GAP 14, AST 64, D -DIMER 0.83.  imaging shows bilateral lower lobe bronchi with tree in bud opacities, concerning for infection vs inflammation. Lyme positive.   Initially treated with iv ceftriaxone, iv azithromax, iv D5% &.45 % ns with kcl 40 meq, librium, ativan, 2L O2nc  Admit to inpatient med surg for hypoxemia, tick bite, chronic alcoholism with seizure history.  bronchopneumonia.     Anticipated Length of Stay/Certification Statement:  Patient will be admitted on an Inpatient basis with an anticipated length of stay of  > 2 midnights.   Justification for Hospital Stay: IV antibiotics     Date: 9-30-24    Day 2: inpatient med surg  Ciwa 4.  Iv antibiotics changed to iv doxycycline.  Start sq lovenox.  Received iv solumedrol x1.  Iv fluids completed.  Patient is using 3L O2nc to maintain spo2 at least 88%.     ED Treatment-Medication Administration from 09/29/2024 1336 to 09/30/2024 0404         Date/Time Order Dose Route Action     09/29/2024 1628 ondansetron (FOR EMS ONLY) (ZOFRAN) 4 mg/2 mL injection 4 mg   Given to EMS     09/29/2024 1627 LORazepam (ATIVAN) tablet 1 mg 1 mg Oral Given     09/29/2024 1626 aluminum-magnesium hydroxide-simethicone (MAALOX) oral suspension 30 mL 30 mL Oral Given     09/29/2024 2107 ceftriaxone (ROCEPHIN) 1 g/50 mL in dextrose IVPB 1,000 mg Intravenous New Bag     09/29/2024 2007 azithromycin (ZITHROMAX) 500 mg in sodium chloride 0.9% 250mL IVPB 500 mg 500 mg Intravenous New Bag     09/29/2024 2135 guaiFENesin (MUCINEX) 12 hr tablet 600 mg 600 mg Oral Given     09/29/2024 2130 dextrose 5 % and sodium chloride 0.45 % with KCl 40 mEq/L infusion (premix) 125 mL/hr Intravenous New Bag     09/29/2024 2339 chlordiazePOXIDE (LIBRIUM) capsule 25 mg 25 mg Oral Given     09/30/2024 0032 doxycycline (VIBRAMYCIN) 100 mg in sodium chloride 0.9 % 100 mL IVPB 100 mg Intravenous New Bag     09/29/2024 2343 ampicillin-sulbactam (UNASYN) 3 g in sodium chloride 0.9 % 100 mL IVPB 3 g Intravenous New Bag     09/29/2024 2340 thiamine (VITAMIN B1) 100 mg in sodium chloride 0.9 % 50 mL IVPB 100 mg Intravenous New Bag     09/29/2024 8291 magnesium sulfate 2 g/50  mL IVPB (premix) 2 g 2 g Intravenous New Bag         Scheduled Medications:  chlordiazePOXIDE, 25 mg, Oral, Q6H ELISSA  doxycycline, 100 mg, Intravenous, Q12H  enoxaparin, 40 mg, Subcutaneous, Daily  folic acid, 1 mg, Oral, Daily  guaiFENesin, 600 mg, Oral, Q12H ELISSA  multivitamin stress formula, 1 tablet, Oral, Daily  nicotine, 1 patch, Transdermal, Daily  thiamine, 100 mg, Oral, Daily    methylPREDNISolone sodium succinate (Solu-MEDROL) injection 40 mg  Dose: 40 mg  Freq: Once Route: IV  Start: 09/30/24 1245 End: 09/30/24 1301    ampicillin-sulbactam (UNASYN) 3 g in sodium chloride 0.9 % 100 mL IVPB  Dose: 3 g  Freq: Every 6 hours Route: IV  Last Dose: 3 g (09/30/24 0547)  Start: 09/29/24 2330 End: 09/30/24    Continuous IV Infusions:    dextrose 5 % and sodium chloride 0.45 % with KCl 40 mEq/L infusion (premix)  Rate: 125 mL/hr Dose: 125 mL/hr  Freq: Continuous Route: IV  Last Dose: 125 mL/hr (09/29/24 2130)  Start: 09/29/24 2030 End: 09/30/24 0529         PRN Meds:  acetaminophen, 650 mg, Oral, Q6H PRN  albuterol, 2.5 mg, Nebulization, Q4H PRN  aluminum-magnesium hydroxide-simethicone, 30 mL, Oral, Q4H PRN  benzonatate, 100 mg, Oral, TID PRN  LORazepam, 2 mg, Intravenous, Q4H PRN  morphine injection, 2 mg, Intravenous, Q3H PRN  ondansetron, 4 mg, Intravenous, Q6H PRN  polyethylene glycol, 17 g, Oral, Daily PRN      ED Triage Vitals   Temperature Pulse Respirations Blood Pressure SpO2 Pain Score   09/29/24 1344 09/29/24 1344 09/29/24 1344 09/29/24 1344 09/29/24 1344 09/30/24 0431   97.6 °F (36.4 °C) 97 18 158/76 91 % 9     Weight (last 2 days)       Date/Time Weight    09/30/24 0553 63.7 (140.43)    09/30/24 0406 63.7 (140.43)    09/29/24 1344 60.3 (132.94)            Vital Signs (last 3 days)       Date/Time Temp Pulse Resp BP MAP  SpO2 Nasal Cannula O2 Flow Rate (L/min) [place] Coma Scale Score CIWA-Ar Total Pain    09/30/24 14:59:25 98 °F (36.7 °C) 88 16 140/93 109 88 % -- -- -- --    09/30/24 1430 -- 83 -- 128/85  -- -- -- -- 4 --    09/30/24 1031 -- -- -- -- -- -- -- 15 -- --    09/30/24 10:23:15 -- 73 -- 115/79 91 96 % -- -- 3 3    09/30/24 0849 -- -- -- -- -- 95 % -- -- -- --    09/30/24 0830 -- -- -- -- -- -- -- -- 3 --    09/30/24 0501 -- -- -- -- -- -- -- -- -- 5    09/30/24 0443 -- -- -- -- -- -- -- 15 -- --    09/30/24 0431 -- -- -- -- -- -- -- -- -- 9    09/30/24 0430 -- 91 -- 118/84 -- -- -- -- 5 --    09/30/24 0406 98.1 °F (36.7 °C) 88 15 118/83 95 90 % 3 L/min -- -- --    09/30/24 0300 -- 95 13 116/71 88 94 % -- -- -- --    09/30/24 0100 -- 95 16 118/72 89 94 % -- -- -- --    09/30/24 0000 -- 95 12 113/65 83 91 % -- -- -- --    09/29/24 2300 -- 101 15 115/59 79 91 % -- -- -- --    09/29/24 2100 -- 92 17 130/67 92 93 % 2 L/min -- -- --    09/29/24 1930 -- 94 15 127/72 95 93 % -- -- -- --    09/29/24 1730 -- 108 22 172/82 114 89 % -- -- -- --    09/29/24 1700 -- 88 18 126/71 91 93 % 2 L/min -- -- --    09/29/24 1630 -- 93 17 138/78 100 90 % 2 L/min -- -- --    09/29/24 1350 -- -- -- -- -- -- -- 15 -- --    09/29/24 1344 97.6 °F (36.4 °C) 97 18 158/76 -- 91 % 2 L/min -- -- --        CIWA-Ar Score       Row Name 09/30/24 1430 09/30/24 10:23:15 09/30/24 0830       CIWA-Ar    /85 -- --    Pulse 83 -- --    Nausea and Vomiting 0 0 0    Tactile Disturbances 2 0 0    Tremor 1 1 1    Auditory Disturbances 0 0 0    Paroxysmal Sweats 1 0 0    Visual Disturbances 0 0 0    Anxiety 0 0 0    Headache, Fullness in Head 0 2 2    Agitation 0 0 0    Orientation and Clouding of Sensorium 0 0 0    CIWA-Ar Total 4 3 3      Row Name 09/30/24 0430       CIWA-Ar    /84    Pulse 91    Nausea and Vomiting 0    Tactile Disturbances 0    Tremor 1    Auditory Disturbances 0    Paroxysmal Sweats 0    Visual Disturbances 1    Anxiety 0    Headache, Fullness in Head 3    Agitation 0    Orientation and Clouding of Sensorium 0    CIWA-Ar Total 5              Pertinent Labs/Diagnostic Test Results:   Radiology:  CTA ED chest PE study    Final  (09/29 1947)      1. No evidence of pulmonary embolism.   2. Partial opacification of the bilateral lower lobe bronchi with bronchial wall thickening, associated tree-in-bud opacities and centrilobular nodules. Findings concerning for infectious/inflammatory bronchial airways disease.         XR chest 2 views   Final (09/29 1849)      No acute cardiopulmonary disease.        Cardiology:  No orders to display     GI:  No orders to display           Results from last 7 days   Lab Units 09/30/24  0441 09/29/24  1550   WBC Thousand/uL 7.05 6.63   HEMOGLOBIN g/dL 14.2 13.6   HEMATOCRIT % 42.7 40.4   PLATELETS Thousands/uL 185 166   TOTAL NEUT ABS Thousands/µL 4.64 5.26         Results from last 7 days   Lab Units 09/30/24  0441 09/29/24  1550   SODIUM mmol/L 135 139   POTASSIUM mmol/L 4.1 3.4*   CHLORIDE mmol/L 97 102   CO2 mmol/L 28 23   ANION GAP mmol/L 10 14*   BUN mg/dL 10 9   CREATININE mg/dL 0.61 0.45*   EGFR ml/min/1.73sq m 113 125   CALCIUM mg/dL 8.6 8.1*   MAGNESIUM mg/dL 2.1  --    PHOSPHORUS mg/dL 1.7*  --      Results from last 7 days   Lab Units 09/30/24 0441 09/29/24  1550   AST U/L 43* 64*   ALT U/L 34 44   ALK PHOS U/L 55 56   TOTAL PROTEIN g/dL 6.6 6.8   ALBUMIN g/dL 3.7 3.8   TOTAL BILIRUBIN mg/dL 0.59 0.49     Results from last 7 days   Lab Units 09/29/24  1345   POC GLUCOSE mg/dl 115     Results from last 7 days   Lab Units 09/30/24  0441 09/29/24  1550   GLUCOSE RANDOM mg/dL 127 65     Results from last 7 days   Lab Units 09/29/24 2003 09/29/24  1750 09/29/24  1550   HS TNI 0HR ng/L  --   --  17   HS TNI 2HR ng/L  --  14  --    HSTNI D2 ng/L  --  -3  --    HS TNI 4HR ng/L 11  --   --    HSTNI D4 ng/L -6  --   --      Results from last 7 days   Lab Units 09/29/24  1550   D-DIMER QUANTITATIVE ug/ml FEU 0.83*         Results from last 7 days   Lab Units 09/30/24  0441   TSH 3RD GENERATON uIU/mL 1.169     Results from last 7 days   Lab Units 09/30/24  1131 09/30/24  0441   PROCALCITONIN ng/ml  0.12 0.11     Results from last 7 days   Lab Units 09/30/24  0441   LACTIC ACID mmol/L 1.3     Results from last 7 days   Lab Units 09/29/24  1750   BNP pg/mL 65       Results from last 7 days   Lab Units 09/30/24  0441 09/29/24  1550   LYME AB IGG  Positive* Positive*   LYME AB IGM  Positive* Positive*     Results from last 7 days   Lab Units 09/29/24  2125 09/29/24  2115   BLOOD CULTURE  Received in Microbiology Lab.   Culture in Progress. Received in Microbiology Lab. Culture in Progress.       History reviewed. No pertinent past medical history.    Present on Admission:     Hypoxemia requiring supplemental oxygen   CAP (community acquired pneumonia)   Hypoglycemia   Hypokalemia   Tick bite   Continuous chronic alcoholism (HCC)   Tobacco abuse      Admitting Diagnosis:     Chest pain, unspecified [R07.9]  Chest pain [R07.9]  Pneumonia [J18.9]  Hypoxia [R09.02]    Age/Sex: 39 y.o. female    Network Utilization Review Department  ATTENTION: Please call with any questions or concerns to [phone] and carefully listen to the prompts so that you are directed to the right person. All voicemails are confidential.   For Discharge needs, contact Care Management DC Support Team at [phone] opt. [phone]  Send all requests for admission clinical reviews, approved or denied determinations and any other requests to dedicated fax number below belonging to the campus where the patient is receiving treatment. List of dedicated fax numbers for the Facilities:  FACILITY NAME UR FAX NUMBER   ADMISSION DENIALS (Administrative/Medical Necessity) [phone]   DISCHARGE SUPPORT TEAM (NETWORK) [phone]   PARENT CHILD HEALTH (Maternity/NICU/Pediatrics) [phone]   [facility] [phone]   [facility] [phone]   [facility] [phone]   [facility] [phone]   [facility] [phone]   [facility]  [facility] [phone]   [facility] [phone]   [facility] [phone]   [facility] [phone]   Count includes the [facility] [phone]   [facility] [phone]   [facility] [phone]

## 2024-09-30 NOTE — H&P
"H&P - Internal Medicine   Name: Gay Fragoso 39 y.o. female I MRN: [id number]  Unit/Bed#: ED 25 I Date of Admission: 9/29/2024   Date of Service: 9/29/2024 I Hospital Day: 0     Assessment & Plan  Hypoxemia requiring supplemental oxygen  Chest CTA negative for PE but revealed bilateral lower lobe bronchial airways infectious/inflammatory disease, likely community-acquired bronchopneumonia following antecedent viral bronchitis, although aspiration pneumonia is equally likely (continuous alcoholism).  Patient requiring supplemental O2 currently at 2 L/min due to the above bronchopneumonia.  Will admit to telemetry, continue supplemental O2, & consult pulmonary.  Treat above pneumonia with empiric IV Unasyn (added anaerobic coverage) and doxycycline (atypical bacterial pneumonia coverage, ? Lyme disease .. today's serum pregnancy test negative), while awaiting final blood c+s.  Continue IV fluid resuscitation with crystalloid.  Immediate ICU transfer if clinical deterioration, hemodynamic instability or delirium tremens.  CAP (community acquired pneumonia)  See \"hypoxemia..\" above  Tick bite  Patient reports a target rash on her back about 3 weeks ago following tick bite.  Target lesion has resolved spontaneously without antibiotic treatment.  Adequate coverage of Lyme disease with current IV antibiotic regimen.  Lyme titers ordered, infectious disease consult as needed.  Hypoglycemia  Most likely due to poor oral intake as well as alcohol abuse.  Give dextrose containing IV fluid for now.  Check serum TSH, C-peptide and proinsulin levels.  Hypokalemia  Due to GI electrolyte loss from vomiting and diarrhea, against a background of alcoholism.  Commence aggressive IV serum electrolyte replenishment, goal serum K and Mg at least 4 and 2 or greater respectively.  Continue telemetry.  Continuous chronic alcoholism (HCC)  Patient reports getting \"shaky\" when she abruptly stops drinking alcohol.  She is at high risk of " alcohol withdrawal.  Start seizure precautions, IV lorazepam as needed for agitation or alcohol withdrawal symptoms, otherwise complete 3-day course of Librium as tolerated for alcohol withdrawal prophylaxis.  Give daily nutritional supplements for alcoholism especially thiamine.  Immediate ICU transfer if clinical deterioration, hemodynamic instability or delirium tremens.  I strongly advised the patient to totally abstain from further alcohol consumption.  Tobacco abuse  I emphasized the importance of smoking cessation to the patient.  Nebulized albuterol as needed for shortness of breath or wheezing.  Frequent incentive spirometry.      VTE Prophylaxis: Enoxaparin (Lovenox)  / sequential compression device   Code Status: full    Anticipated Length of Stay:  Patient will be admitted on an Inpatient basis with an anticipated length of stay of  > 2 midnights.   Justification for Hospital Stay: IV antibiotics    Total Time for Visit, including Counseling / Coordination of Care:  80 mins .  Greater than 50% of this total time spent on direct patient counseling and coordination of care.    Chief Complaint:   trouble breathing    History of Present Illness:    Gay Fragoso is a 39 y.o. female whose past medical history is remarkable for longstanding cigarette smoking and alcoholism.  She smokes about half pack cigarettes daily, usually consumes about a 12 pack of beer daily, her last drink being yesterday.  She denies illicit drug use or prior diagnosis of COPD/asthma.  She reports a large bull's-eye rash on her back about 3 weeks ago following a tick bite.  It resolved spontaneously without antibiotic treatment.  Patient began to feel unwell about 5 days ago with increasing chest congestion, malaise, and a nonproductive cough.  Watery diarrhea, nausea and vomiting at the onset of symptoms have since resolved.  She called the ambulance due to profound difficulty breathing since this morning associated with pleuritic  chest pain while coughing & generalized weakness.  She received D10W via EMS en route to this facility's ED due to a fingerstick of 55 out on the field.  Following ambulatory pulse ox down to 84% on room air, she was placed on supplemental oxygen currently at 2 L/min via nasal cannula.  CTA chest was negative for pulm embolism but showed findings concerning for infectious/inflammatory bronchial airways disease in the lower lobes.  Pertinent ED labs included serum sodium 139, potassium 3.4, BUN 9, creatinine 0.45, glucose 65, AST 64, ALT 44, alkaline phosphatase 56, T. bili 0.49, serial troponin negative, BNP 65, WBC 6.63, hemoglobin 13.6, platelet count 166, serum pregnancy test negative, rapid influenza A/B and COVID-19 negative.  Patient received at the ED IV ceftriaxone and azithromycin, as well as oral Maalox & Ativan.  Her hospitalization for further management was subsequently sought.    Review of Systems:    A 10+ point systems review was obtained & is as above, otherwise negative.  Review of Systems    Past Medical and Surgical History:     History reviewed. No pertinent past medical history.    Past Surgical History:   Procedure Laterality Date    ADENOIDECTOMY       SECTION  2014    TONSILLECTOMY         Meds/Allergies:    Prior to Admission medications    Not on File     I have reviewed home medications with patient personally.    Allergies: No Known Allergies    Social History:     Marital Status: /Civil Union   Patient Pre-hospital Living Situation: home  Patient Pre-hospital Level of Mobility: independent  Substance Use History:   Social History     Substance and Sexual Activity   Alcohol Use Not Currently    Comment: pt reports 8 white claws a day     Social History     Tobacco Use   Smoking Status Every Day    Current packs/day: 0.50    Types: Cigarettes   Smokeless Tobacco Current     Social History     Substance and Sexual Activity   Drug Use Never       Family History:    Family  "history non-contributory    Physical Exam:   Constitutional: well nourished, in no overt resp distress  HEENT: oral mucosa dry, not pale or jaundiced  Neck: supple, no JVD  Resp: coarse breath sounds with rhonchi at the lung bases, no wheeze  Cardiovascular: S1 S2 audible, regular  GI: soft abdomen, nontender, bowel sounds present  Musculoskeletal: no pedal edema or cyanosis  Skin: no petechiae or suspicious rash  Psych: appropriately oriented in all spheres  Neuro: Awake, alert, verbally responsive & follows commands, moves all extremities equally, asterixis negative, essentially nonfocal      Vitals:   Blood Pressure: 130/67 (09/29/24 2100)  Pulse: 92 (09/29/24 2100)  Temperature: 97.6 °F (36.4 °C) (09/29/24 1344)  Temp Source: Oral (09/29/24 1344)  Respirations: 17 (09/29/24 2100)  Height: 5' 4\" (162.6 cm) (09/29/24 1344)  Weight - Scale: 60.3 kg (132 lb 15 oz) (09/29/24 1344)  SpO2: 93 % (09/29/24 2100)      Additional Data:     Lab Results:     Results from last 7 days   Lab Units 09/29/24  1550   WBC Thousand/uL 6.63   HEMOGLOBIN g/dL 13.6   HEMATOCRIT % 40.4   PLATELETS Thousands/uL 166   SEGS PCT % 79*   LYMPHO PCT % 12*   MONO PCT % 9   EOS PCT % 0     Results from last 7 days   Lab Units 09/29/24  1550   POTASSIUM mmol/L 3.4*   CHLORIDE mmol/L 102   CO2 mmol/L 23   BUN mg/dL 9   CREATININE mg/dL 0.45*   CALCIUM mg/dL 8.1*   ALK PHOS U/L 56   ALT U/L 44   AST U/L 64*         Invalid input(s): \"TROIP\"    Imaging: Reviewed radiology reports from this admission including: CT chest.    CTA ED chest PE study    Result Date: 9/29/2024  Narrative: CTA - CHEST WITH IV CONTRAST - PULMONARY ANGIOGRAM INDICATION: Chest pain and hypoxia. COMPARISON: Chest x-ray 9/29/2024. TECHNIQUE: CTA examination of the chest was performed using angiographic technique according to a protocol specifically tailored to evaluate for pulmonary embolism. Multiplanar 2D reformatted images were created from the source data. In addition, " coronal  3D MIP postprocessing was performed on the acquisition scanner. Radiation dose length product (DLP) for this visit: 304 mGy-cm . This examination, like all CT scans performed in the [facility], was performed utilizing techniques to minimize radiation dose exposure, including the use of iterative reconstruction and automated exposure control. IV Contrast: 100 mL of iohexol (OMNIPAQUE) FINDINGS: PULMONARY ARTERIAL TREE:  No pulmonary embolus. LUNGS: Bronchial wall thickening and partial opacification of bilateral lower lobe bronchi. Scattered centrilobular nodules and tree-in-bud opacities in the bilateral lower lung fields. PLEURA: Unremarkable. HEART/GREAT VESSELS: Heart is unremarkable for patient's age. No thoracic aortic aneurysm. MEDIASTINUM AND TAYLOR: Unremarkable. CHEST WALL AND LOWER NECK: Bilateral breast prostheses. VISUALIZED STRUCTURES IN THE UPPER ABDOMEN: Low-attenuation liver may be related to fat infiltration. OSSEOUS STRUCTURES: No acute fracture or destructive osseous lesion. Scattered degenerative changes of the visualized spine with endplate sclerotic changes.     Impression: 1. No evidence of pulmonary embolism. 2. Partial opacification of the bilateral lower lobe bronchi with bronchial wall thickening, associated tree-in-bud opacities and centrilobular nodules. Findings concerning for infectious/inflammatory bronchial airways disease. The study was marked in EPIC for immediate notification. Workstation performed: [id number]     XR chest 2 views    Result Date: 9/29/2024  Narrative: XR CHEST PA AND LATERAL INDICATION: cp. COMPARISON: None FINDINGS: Clear lungs. No pneumothorax or pleural effusion. Normal cardiomediastinal silhouette. Bones are unremarkable for age. Normal upper abdomen.     Impression: No acute cardiopulmonary disease. Workstation performed: [id number]         ** Please Note: Dragon 360 Dictation voice to text software may have been used in the creation of  this document.

## 2024-09-30 NOTE — ASSESSMENT & PLAN NOTE
He reported a tick bite approximately 3 weeks ago with a self resolving rash  Lyme labs have been drawn continue to monitor  Continue patient on Doxy alone

## 2024-09-30 NOTE — ASSESSMENT & PLAN NOTE
I emphasized the importance of smoking cessation to the patient.  Nebulized albuterol as needed for shortness of breath or wheezing.  Frequent incentive spirometry.

## 2024-09-30 NOTE — ASSESSMENT & PLAN NOTE
Most likely due to poor oral intake as well as alcohol abuse.  Give dextrose containing IV fluid for now.  Check serum TSH, C-peptide and proinsulin levels.

## 2024-09-30 NOTE — ASSESSMENT & PLAN NOTE
Patient had a low normal blood glucose has since improved likely suspect in setting of GI losses  Appears to be resolved continue to monitor on labs

## 2024-09-30 NOTE — ASSESSMENT & PLAN NOTE
Patient reports a target rash on her back about 3 weeks ago following tick bite.  Target lesion has resolved spontaneously without antibiotic treatment.  Adequate coverage of Lyme disease with current IV antibiotic regimen.  Lyme titers ordered, infectious disease consult as needed.

## 2024-09-30 NOTE — ASSESSMENT & PLAN NOTE
"Patient reports getting \"shaky\" when she abruptly stops drinking alcohol.  She is at high risk of alcohol withdrawal.  Start seizure precautions, IV lorazepam as needed for agitation or alcohol withdrawal symptoms, otherwise complete 3-day course of Librium as tolerated for alcohol withdrawal prophylaxis.  Give daily nutritional supplements for alcoholism especially thiamine.  Immediate ICU transfer if clinical deterioration, hemodynamic instability or delirium tremens.  I strongly advised the patient to totally abstain from further alcohol consumption.  "

## 2024-09-30 NOTE — ASSESSMENT & PLAN NOTE
Presented with pleuritic chest pain and shortness of breath to the ED  CT chest: No evidence of pulmonary embolism. Partial opacification of the bilateral lower lobe bronchi with bronchial wall thickening, associated tree-in-bud opacities and centrilobular nodules. Findings concerning for infectious/inflammatory bronchial airways disease.  Initial procalcitonin is normal will repeat procalcitonin likely suspect could be more of a bronchitis she may benefit more from steroids ordered one-time dose to see patient response  Patient did not meet SIRS criteria  Cultures x 2 are pending  Continue Mucinex Tessalon Perles albuterol as needed  Respiratory protocol

## 2024-09-30 NOTE — PROGRESS NOTES
Progress Note - Hospitalist   Name: Gay Fragoso 39 y.o. female I MRN: [id number]  Unit/Bed#: -01 I Date of Admission: 9/29/2024   Date of Service: 9/30/2024 I Hospital Day: 1    Assessment & Plan  CAP (community acquired pneumonia)  Presented with pleuritic chest pain and shortness of breath to the ED  CT chest: No evidence of pulmonary embolism. Partial opacification of the bilateral lower lobe bronchi with bronchial wall thickening, associated tree-in-bud opacities and centrilobular nodules. Findings concerning for infectious/inflammatory bronchial airways disease.  Initial procalcitonin is normal will repeat procalcitonin likely suspect could be more of a bronchitis she may benefit more from steroids ordered one-time dose to see patient response  Patient did not meet SIRS criteria  Cultures x 2 are pending  Continue Mucinex Tessalon Perles albuterol as needed  Respiratory protocol  Hypoxemia requiring supplemental oxygen  Patient had 1 noted episode of hypoxia currently on 2-3 L of oxygen  To maintain sats greater than 90%  Respiratory protocol  Hypoglycemia  Patient had a low normal blood glucose has since improved likely suspect in setting of GI losses  Appears to be resolved continue to monitor on labs  Hypokalemia  Mild on admission at 3.4 likely in setting of vomiting/GI losses  Status post replete K now 4.1  Replete as needed  Resolved  Tick bite  He reported a tick bite approximately 3 weeks ago with a self resolving rash  Lyme labs have been drawn continue to monitor  Continue patient on Doxy alone  Continuous chronic alcoholism (HCC)  Patient endorses drinking 12 beers a day  CIWA remains low this morning at 3  Continue CIWA protocol  Tobacco abuse  Reports being a half a pack smoker  Cessation strongly encouraged  Continue nicotine patch    VTE Pharmacologic Prophylaxis:   Moderate Risk (Score 3-4) - Pharmacological DVT Prophylaxis Ordered: enoxaparin (Lovenox).    Mobility:   Basic Mobility  Inpatient Raw Score: 24  JH-HLM Goal: 8: Walk 250 feet or more  JH-HLM Achieved: 6: Walk 10 steps or more  JH-HLM Goal NOT achieved. Continue with multidisciplinary rounding and encourage appropriate mobility to improve upon JH-HLM goals.    Patient Centered Rounds: I performed bedside rounds with nursing staff today.   Discussions with Specialists or Other Care Team Provider: Case management    Education and Discussions with Family / Patient: Patient declined call to .     Current Length of Stay: 1 day(s)  Current Patient Status: Inpatient   Certification Statement: The patient will continue to require additional inpatient hospital stay due to bronchitis versus pneumonia with need for further inpatient treatment.  Discharge Plan: Anticipate discharge tomorrow to home.    Code Status: Level 1 - Full Code    Subjective   Patient reporting she would prefer to go home if possible if she is doing better.  Made aware would trial her off oxygen also give her a dose of steroids to see if this make her feel better if her symptoms persist recommend her staying.  Denies anxiety and denies withdrawal symptoms at this time.    Objective     Vitals:   Temp (24hrs), Av.1 °F (36.7 °C), Min:98 °F (36.7 °C), Max:98.1 °F (36.7 °C)    Temp:  [98 °F (36.7 °C)-98.1 °F (36.7 °C)] 98 °F (36.7 °C)  HR:  [] 88  Resp:  [12-17] 16  BP: (113-140)/(59-93) 140/93  SpO2:  [88 %-96 %] 88 %  Body mass index is 24.11 kg/m².     Input and Output Summary (last 24 hours):     Intake/Output Summary (Last 24 hours) at 2024 1810  Last data filed at 2024 1100  Gross per 24 hour   Intake 570 ml   Output 0 ml   Net 570 ml       Physical Exam  Constitutional:       Appearance: She is well-developed.   HENT:      Head: Normocephalic and atraumatic.   Eyes:      Conjunctiva/sclera: Conjunctivae normal.   Cardiovascular:      Rate and Rhythm: Normal rate and regular rhythm.      Heart sounds: Normal heart sounds.   Pulmonary:       [place]: No accessory muscle usage.      Breath sounds: Examination of the right-upper field reveals wheezing. Examination of the left-upper field reveals wheezing. Examination of the right-middle field reveals wheezing. Examination of the right-lower field reveals decreased breath sounds. Examination of the left-lower field reveals decreased breath sounds. Decreased breath sounds and wheezing present.   Abdominal:      General: Bowel sounds are normal.      Palpations: Abdomen is soft.   Musculoskeletal:         General: Normal range of motion.      Cervical back: Normal range of motion.   Skin:     General: Skin is warm and dry.   Neurological:      Mental Status: She is alert and oriented to person, place, and time.   Psychiatric:         Behavior: Behavior normal.          Lines/Drains:  Lines/Drains/Airways       Active Status       None                      Telemetry:  Telemetry Orders (From admission, onward)               24 Hour Telemetry Monitoring  Continuous x 24 Hours (Telem)        Question:  Reason for 24 Hour Telemetry  Answer:  Acute respiratory failure on BiPAP                     Telemetry Reviewed: Normal Sinus Rhythm  Indication for Continued Telemetry Use: No indication for continued use. Will discontinue.                Lab Results: I have reviewed the following results: CBC/BMP:   .     09/30/24  0441   WBC 7.05   HGB 14.2   HCT 42.7      SODIUM 135   K 4.1   CL 97   CO2 28   BUN 10   CREATININE 0.61   GLUC 127   MG 2.1   PHOS 1.7*       Results from last 7 days   Lab Units 09/30/24  0441   WBC Thousand/uL 7.05   HEMOGLOBIN g/dL 14.2   HEMATOCRIT % 42.7   PLATELETS Thousands/uL 185   SEGS PCT % 66   LYMPHO PCT % 19   MONO PCT % 15*   EOS PCT % 0     Results from last 7 days   Lab Units 09/30/24  0441   SODIUM mmol/L 135   POTASSIUM mmol/L 4.1   CHLORIDE mmol/L 97   CO2 mmol/L 28   BUN mg/dL 10   CREATININE mg/dL 0.61   ANION GAP mmol/L 10   CALCIUM mg/dL 8.6   ALBUMIN g/dL 3.7   TOTAL  BILIRUBIN mg/dL 0.59   ALK PHOS U/L 55   ALT U/L 34   AST U/L 43*   GLUCOSE RANDOM mg/dL 127         Results from last 7 days   Lab Units 09/29/24  1345   POC GLUCOSE mg/dl 115         Results from last 7 days   Lab Units 09/30/24  1131 09/30/24  0441   LACTIC ACID mmol/L  --  1.3   PROCALCITONIN ng/ml 0.12 0.11       Recent Cultures (last 7 days):   Results from last 7 days   Lab Units 09/29/24  2125 09/29/24  2115   BLOOD CULTURE  Received in Microbiology Lab. Culture in Progress. Received in Microbiology Lab. Culture in Progress.       Imaging Review: Reviewed radiology reports from this admission including: CT chest.  Other Studies: No additional pertinent studies reviewed.    Last 24 Hours Medication List:     Current Facility-Administered Medications:     acetaminophen (TYLENOL) tablet 650 mg, Q6H PRN    albuterol inhalation solution 2.5 mg, Q4H PRN    aluminum-magnesium hydroxide-simethicone (MAALOX) oral suspension 30 mL, Q4H PRN    benzonatate (TESSALON PERLES) capsule 100 mg, TID PRN    chlordiazePOXIDE (LIBRIUM) capsule 25 mg, Q6H ELISSA    doxycycline (VIBRAMYCIN) 100 mg in sodium chloride 0.9 % 100 mL IVPB, Q12H, Last Rate: 100 mg (09/30/24 1158)    enoxaparin (LOVENOX) subcutaneous injection 40 mg, Daily    folic acid (FOLVITE) tablet 1 mg, Daily    guaiFENesin (MUCINEX) 12 hr tablet 600 mg, Q12H ELISSA    LORazepam (ATIVAN) injection 2 mg, Q4H PRN    multivitamin stress formula tablet 1 tablet, Daily    nicotine (NICODERM CQ) 14 mg/24hr TD 24 hr patch 1 patch, Daily    ondansetron (ZOFRAN) injection 4 mg, Q6H PRN    polyethylene glycol (MIRALAX) packet 17 g, Daily PRN    thiamine tablet 100 mg, Daily    Administrative Statements   Today, Patient Was Seen By: RISSA Gabriel  I have spent a total time of 45 minutes in caring for this patient on the day of the visit/encounter including Risks and benefits of tx options, Instructions for management, Importance of tx compliance, Counseling / Coordination of  care, Documenting in the medical record, and Reviewing / ordering tests, medicine, procedures  .    **Please Note: This note may have been constructed using a voice recognition system.**

## 2024-09-30 NOTE — ASSESSMENT & PLAN NOTE
Chest CTA negative for PE but revealed bilateral lower lobe bronchial airways infectious/inflammatory disease, likely community-acquired bronchopneumonia following antecedent viral bronchitis, although aspiration pneumonia is equally likely (continuous alcoholism).  Patient requiring supplemental O2 currently at 2 L/min due to the above bronchopneumonia.  Will admit to telemetry, continue supplemental O2, & consult pulmonary.  Treat above pneumonia with empiric IV Unasyn (added anaerobic coverage) and doxycycline (atypical bacterial pneumonia coverage, ? Lyme disease .. today's serum pregnancy test negative), while awaiting final blood c+s.  Continue IV fluid resuscitation with crystalloid.  Immediate ICU transfer if clinical deterioration, hemodynamic instability or delirium tremens.

## 2024-09-30 NOTE — ASSESSMENT & PLAN NOTE
Patient had 1 noted episode of hypoxia currently on 2-3 L of oxygen  To maintain sats greater than 90%  Respiratory protocol

## 2024-09-30 NOTE — ASSESSMENT & PLAN NOTE
Due to GI electrolyte loss from vomiting and diarrhea, against a background of alcoholism.  Commence aggressive IV serum electrolyte replenishment, goal serum K and Mg at least 4 and 2 or greater respectively.  Continue telemetry.

## 2024-09-30 NOTE — RESPIRATORY THERAPY NOTE
RT Protocol Note  Gay Fragoso 39 y.o. female MRN: [id number]  Unit/Bed#: -01 Encounter: [id number]    Assessment    Principal Problem:    Hypoxemia requiring supplemental oxygen  Active Problems:    CAP (community acquired pneumonia)    Hypoglycemia    Hypokalemia    Tick bite    Continuous chronic alcoholism (HCC)    Tobacco abuse      Home Pulmonary Medications:  none       History reviewed. No pertinent past medical history.  Social History     Socioeconomic History    Marital status: /Civil Union     Spouse name: None    Number of children: None    Years of education: None    Highest education level: None   Occupational History    None   Tobacco Use    Smoking status: Every Day     Current packs/day: 0.50     Types: Cigarettes    Smokeless tobacco: Current   Substance and Sexual Activity    Alcohol use: Not Currently     Comment: pt reports 8 white claws a day    Drug use: Never    Sexual activity: None   Other Topics Concern    None   Social History Narrative    None     Social Determinants of Health     Financial Resource Strain: Not on file   Food Insecurity: No Food Insecurity (9/30/2024)    Hunger Vital Sign     Worried About Running Out of Food in the Last Year: Never true     Ran Out of Food in the Last Year: Never true   Transportation Needs: No Transportation Needs (9/30/2024)    PRAPARE - Transportation     Lack of Transportation (Medical): No     Lack of Transportation (Non-Medical): No   Physical Activity: Not on file   Stress: Not on file   Social Connections: Not on file   Intimate Partner Violence: Not on file   Housing Stability: Low Risk  (9/30/2024)    Housing Stability Vital Sign     Unable to Pay for Housing in the Last Year: No     Number of Times Moved in the Last Year: 0     Homeless in the Last Year: No       Subjective         Objective    Physical Exam:   Cough: Productive    Vitals:  Blood pressure 118/84, pulse 91, temperature 98.1 °F (36.7 °C), temperature source Oral,  "resp. rate 15, height 5' 4\" (1.626 m), weight 63.7 kg (140 lb 6.9 oz), last menstrual period 09/23/2024, SpO2 95%.          Imaging and other studies: No pertinent imaging studies reviewed.          Plan    Respiratory Plan: No distress/Pulmonary history        Resp Comments: pt was ordered albuterol for shortness of breath, will continue with this order   "

## 2024-09-30 NOTE — ASSESSMENT & PLAN NOTE
Mild on admission at 3.4 likely in setting of vomiting/GI losses  Status post replete K now 4.1  Replete as needed  Resolved

## 2024-09-30 NOTE — ASSESSMENT & PLAN NOTE
Patient endorses drinking 12 beers a day  CIWA remains low this morning at 3  Continue CIWA protocol

## 2024-10-01 PROBLEM — J96.01 ACUTE RESPIRATORY FAILURE WITH HYPOXIA (HCC): Status: ACTIVE | Noted: 2024-10-01
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
## 2024-10-01 PROBLEM — E16.2 HYPOGLYCEMIA: Status: RESOLVED | Noted: 2024-09-29 | Resolved: 2024-10-01

## 2024-10-01 PROBLEM — E87.6 HYPOKALEMIA: Status: RESOLVED | Noted: 2024-09-29 | Resolved: 2024-10-01

## 2024-10-01 PROBLEM — J40 BRONCHITIS: Status: ACTIVE | Noted: 2024-10-01

## 2024-10-01 PROBLEM — A69.20 ACUTE LYME DISEASE: Status: ACTIVE | Noted: 2024-10-01

## 2024-10-01 LAB
ALBUMIN SERPL BCG-MCNC: 3.9 G/DL (ref 3.5–5)
ALP SERPL-CCNC: 57 U/L (ref 34–104)
ALT SERPL W P-5'-P-CCNC: 30 U/L (ref 7–52)
ANION GAP SERPL CALCULATED.3IONS-SCNC: 5 MMOL/L (ref 4–13)
AST SERPL W P-5'-P-CCNC: 30 U/L (ref 13–39)
B PARAP IS1001 DNA NPH QL NAA+NON-PROBE: NOT DETECTED
B PERT.PT PRMT NPH QL NAA+NON-PROBE: NOT DETECTED
BILIRUB SERPL-MCNC: 0.6 MG/DL (ref 0.2–1)
BUN SERPL-MCNC: 6 MG/DL (ref 5–25)
C PEPTIDE SERPL-MCNC: 3.2 NG/ML (ref 1.1–4.4)
C PNEUM DNA NPH QL NAA+NON-PROBE: NOT DETECTED
CALCIUM SERPL-MCNC: 9.1 MG/DL (ref 8.4–10.2)
CHLORIDE SERPL-SCNC: 100 MMOL/L (ref 96–108)
CO2 SERPL-SCNC: 33 MMOL/L (ref 21–32)
CREAT SERPL-MCNC: 0.48 MG/DL (ref 0.6–1.3)
ERYTHROCYTE [DISTWIDTH] IN BLOOD BY AUTOMATED COUNT: 12 % (ref 11.6–15.1)
FLUAV RNA NPH QL NAA+NON-PROBE: NOT DETECTED
FLUBV RNA NPH QL NAA+NON-PROBE: NOT DETECTED
GFR SERPL CREATININE-BSD FRML MDRD: 123 ML/MIN/1.73SQ M
GLUCOSE SERPL-MCNC: 114 MG/DL (ref 65–140)
HADV DNA NPH QL NAA+NON-PROBE: NOT DETECTED
HCOV 229E RNA NPH QL NAA+NON-PROBE: NOT DETECTED
HCOV HKU1 RNA NPH QL NAA+NON-PROBE: NOT DETECTED
HCOV NL63 RNA NPH QL NAA+NON-PROBE: NOT DETECTED
HCOV OC43 RNA NPH QL NAA+NON-PROBE: NOT DETECTED
HCT VFR BLD AUTO: 42.7 % (ref 34.8–46.1)
HGB BLD-MCNC: 14.1 G/DL (ref 11.5–15.4)
HMPV RNA NPH QL NAA+NON-PROBE: NOT DETECTED
HPIV1 RNA NPH QL NAA+NON-PROBE: NOT DETECTED
HPIV2 RNA NPH QL NAA+NON-PROBE: NOT DETECTED
HPIV3 RNA NPH QL NAA+NON-PROBE: NOT DETECTED
HPIV4 RNA NPH QL NAA+NON-PROBE: NOT DETECTED
M PNEUMO DNA NPH QL NAA+NON-PROBE: NOT DETECTED
MCH RBC QN AUTO: 31.8 PG (ref 26.8–34.3)
MCHC RBC AUTO-ENTMCNC: 33 G/DL (ref 31.4–37.4)
MCV RBC AUTO: 96 FL (ref 82–98)
PLATELET # BLD AUTO: 177 THOUSANDS/UL (ref 149–390)
PMV BLD AUTO: 9.3 FL (ref 8.9–12.7)
POTASSIUM SERPL-SCNC: 4.1 MMOL/L (ref 3.5–5.3)
PROCALCITONIN SERPL-MCNC: 0.1 NG/ML
PROT SERPL-MCNC: 7 G/DL (ref 6.4–8.4)
RBC # BLD AUTO: 4.43 MILLION/UL (ref 3.81–5.12)
RSV RNA NPH QL NAA+NON-PROBE: NOT DETECTED
RV+EV RNA NPH QL NAA+NON-PROBE: DETECTED
SARS-COV-2 RNA NPH QL NAA+NON-PROBE: NOT DETECTED
SODIUM SERPL-SCNC: 138 MMOL/L (ref 135–147)
WBC # BLD AUTO: 4.71 THOUSAND/UL (ref 4.31–10.16)

## 2024-10-01 PROCEDURE — 85027 COMPLETE CBC AUTOMATED: CPT | Performed by: NURSE PRACTITIONER

## 2024-10-01 PROCEDURE — 99223 1ST HOSP IP/OBS HIGH 75: CPT | Performed by: INTERNAL MEDICINE

## 2024-10-01 PROCEDURE — 80053 COMPREHEN METABOLIC PANEL: CPT | Performed by: NURSE PRACTITIONER

## 2024-10-01 PROCEDURE — 84145 PROCALCITONIN (PCT): CPT | Performed by: NURSE PRACTITIONER

## 2024-10-01 PROCEDURE — 0202U NFCT DS 22 TRGT SARS-COV-2: CPT | Performed by: INTERNAL MEDICINE

## 2024-10-01 RX ORDER — METHYLPREDNISOLONE SODIUM SUCCINATE 40 MG/ML
40 INJECTION, POWDER, LYOPHILIZED, FOR SOLUTION INTRAMUSCULAR; INTRAVENOUS EVERY 12 HOURS SCHEDULED
Status: DISCONTINUED | OUTPATIENT
Start: 2024-10-01 | End: 2024-10-01

## 2024-10-01 RX ORDER — HYDROCODONE BITARTRATE AND HOMATROPINE METHYLBROMIDE ORAL SOLUTION 5; 1.5 MG/5ML; MG/5ML
5 LIQUID ORAL EVERY 4 HOURS PRN
Status: DISCONTINUED | OUTPATIENT
Start: 2024-10-01 | End: 2024-10-02 | Stop reason: HOSPADM

## 2024-10-01 RX ORDER — METHYLPREDNISOLONE SODIUM SUCCINATE 40 MG/ML
40 INJECTION, POWDER, LYOPHILIZED, FOR SOLUTION INTRAMUSCULAR; INTRAVENOUS EVERY 8 HOURS SCHEDULED
Status: DISCONTINUED | OUTPATIENT
Start: 2024-10-01 | End: 2024-10-02 | Stop reason: HOSPADM

## 2024-10-01 RX ORDER — DOXYCYCLINE 100 MG/1
100 CAPSULE ORAL EVERY 12 HOURS SCHEDULED
Status: DISCONTINUED | OUTPATIENT
Start: 2024-10-01 | End: 2024-10-02 | Stop reason: HOSPADM

## 2024-10-01 RX ORDER — DOXYCYCLINE 100 MG/1
100 CAPSULE ORAL EVERY 12 HOURS SCHEDULED
Status: DISCONTINUED | OUTPATIENT
Start: 2024-10-01 | End: 2024-10-01

## 2024-10-01 RX ADMIN — CHLORDIAZEPOXIDE HYDROCHLORIDE 25 MG: 25 CAPSULE ORAL at 11:33

## 2024-10-01 RX ADMIN — CHLORDIAZEPOXIDE HYDROCHLORIDE 25 MG: 25 CAPSULE ORAL at 06:08

## 2024-10-01 RX ADMIN — DOXYCYCLINE HYCLATE 100 MG: 100 CAPSULE ORAL at 10:18

## 2024-10-01 RX ADMIN — ACETAMINOPHEN 650 MG: 325 TABLET ORAL at 10:18

## 2024-10-01 RX ADMIN — DOXYCYCLINE HYCLATE 100 MG: 100 CAPSULE ORAL at 22:24

## 2024-10-01 RX ADMIN — CHLORDIAZEPOXIDE HYDROCHLORIDE 25 MG: 25 CAPSULE ORAL at 17:11

## 2024-10-01 RX ADMIN — GUAIFENESIN 600 MG: 600 TABLET ORAL at 22:24

## 2024-10-01 RX ADMIN — THIAMINE HCL TAB 100 MG 100 MG: 100 TAB at 09:04

## 2024-10-01 RX ADMIN — ONDANSETRON 4 MG: 2 INJECTION INTRAMUSCULAR; INTRAVENOUS at 22:24

## 2024-10-01 RX ADMIN — NICOTINE 1 PATCH: 14 PATCH, EXTENDED RELEASE TRANSDERMAL at 09:04

## 2024-10-01 RX ADMIN — B-COMPLEX W/ C & FOLIC ACID TAB 1 TABLET: TAB at 09:04

## 2024-10-01 RX ADMIN — METHYLPREDNISOLONE SODIUM SUCCINATE 40 MG: 40 INJECTION, POWDER, FOR SOLUTION INTRAMUSCULAR; INTRAVENOUS at 22:24

## 2024-10-01 RX ADMIN — FOLIC ACID 1 MG: 1 TABLET ORAL at 09:04

## 2024-10-01 RX ADMIN — ENOXAPARIN SODIUM 40 MG: 40 INJECTION SUBCUTANEOUS at 09:04

## 2024-10-01 RX ADMIN — GUAIFENESIN 600 MG: 600 TABLET ORAL at 09:04

## 2024-10-01 RX ADMIN — METHYLPREDNISOLONE SODIUM SUCCINATE 40 MG: 40 INJECTION, POWDER, FOR SOLUTION INTRAMUSCULAR; INTRAVENOUS at 11:33

## 2024-10-01 NOTE — CASE MANAGEMENT
Case Management Assessment & Discharge Planning Note    Patient name Gay Fragoso  Location /-01 MRN [id number]  : 1985 Date 10/1/2024       Current Admission Date: 2024  Current Admission Diagnosis:CAP (community acquired pneumonia)   Patient Active Problem List    Diagnosis Date Noted Date Diagnosed    Bronchitis 10/01/2024     Acute Lyme disease 10/01/2024     Hypoxemia requiring supplemental oxygen 2024     CAP (community acquired pneumonia) 2024     Hypoglycemia 2024     Hypokalemia 2024     Tick bite 2024     Continuous chronic alcoholism (HCC) 2024     Tobacco abuse 2024       LOS (days): 2  Geometric Mean LOS (GMLOS) (days):   Days to GMLOS:     OBJECTIVE:    Risk of Unplanned Readmission Score: 10.84         Current admission status: Inpatient       [facility]   [id number] [place] [facility] [address] [address]  Phone: [phone] Fax: [phone]    Primary Care Provider: No primary care provider on file.    Primary Insurance: [employer] [facility]  Secondary Insurance:     ASSESSMENT:  Active Health Care Proxies    There are no active Health Care Proxies on file.       Advance Directives  Does patient have a Health Care POA?: Yes  Does patient have Advance Directives?: Yes  Primary Contact: herminia Fragoso (Spouse)  [phone]         Readmission Root Cause  30 Day Readmission: No    Patient Information  Admitted from:: Home  Mental Status: Alert  During Assessment patient was accompanied by: Not accompanied during assessment  Assessment information provided by:: Patient  Primary Caregiver: Self  Support Systems: Friend, Spouse/significant other, Children, Family members  County of Residence: [place]  What city do you live in?: [place]  Home entry access options. Select all that apply.: No steps to enter home  Type of Current Residence: [place]  Living Arrangements: Lives w/  Spouse/significant other, Lives w/ Children, Lives w/ Parent(s)  Is patient a ?: No    Activities of Daily Living Prior to Admission  Functional Status: Independent  Completes ADLs independently?: Yes  Ambulates independently?: Yes  Does patient use assisted devices?: No  Does patient currently own DME?: No  Does patient have a history of Outpatient Therapy (PT/OT)?: No  Does the patient have a history of Short-Term Rehab?: No  Does patient have a history of HHC?: No  Does patient currently have HHC?: No         Patient Information Continued  Income Source: Unemployed  Does patient have prescription coverage?: Yes  Does patient have a history of substance abuse?: Yes  Historical substance use preference: Alcohol/ETOH  Does patient have a history of Mental Health Diagnosis?: No         Means of Transportation  Means of Transport to Appts:: Drives Self      Social Determinants of Health (SDOH)      Flowsheet Row Most Recent Value   Housing Stability    In the last 12 months, was there a time when you were not able to pay the mortgage or rent on time? N   In the past 12 months, how many times have you moved where you were living? 0   At any time in the past 12 months, were you homeless or living in a shelter (including now)? N   Transportation Needs    In the past 12 months, has lack of transportation kept you from medical appointments or from getting medications? no   In the past 12 months, has lack of transportation kept you from meetings, work, or from getting things needed for daily living? No   Food Insecurity    Within the past 12 months, you worried that your food would run out before you got the money to buy more. Never true   Within the past 12 months, the food you bought just didn't last and you didn't have money to get more. Never true   Utilities    In the past 12 months has the electric, gas, oil, or water company threatened to shut off services in your home? No            DISCHARGE  DETAILS:    Discharge planning discussed with:: Pt  Freedom of Choice: Yes  Comments - Freedom of Choice: CM called and spoke with pt at bedside due to Airborne Isolation. Pt is alert and oriented x3 able to make her needs known and encouraged to do so. FOC discussed at [facility]. Pt has an AMPAC 24, no CM needs anticiapted. CM continues to follow and assist with pt dc plans.  CM contacted family/caregiver?: No- see comments (Pt to update her family)  Were Treatment Team discharge recommendations reviewed with patient/caregiver?: Yes  Did patient/caregiver verbalize understanding of patient care needs?: Yes  Were patient/caregiver advised of the risks associated with not following Treatment Team discharge recommendations?: Yes    Contacts  Reason/Outcome: Continuity of Care, Emergency Contact, Referral, Discharge Planning    Requested Home Health Care         Is the patient interested in HHC at discharge?: No    DME Referral Provided  Referral made for DME?: No    Other Referral/Resources/Interventions Provided:  Interventions: None Indicated    Would you like to participate in our [facility] service program?  : No - Declined    Treatment Team Recommendation: Home  Discharge Destination Plan:: Home  Transport at Discharge : Family

## 2024-10-01 NOTE — PLAN OF CARE
Problem: PAIN - ADULT  Goal: Verbalizes/displays adequate comfort level or baseline comfort level  Description: Interventions:  - Encourage patient to monitor pain and request assistance  - Assess pain using appropriate pain scale  - Administer analgesics based on type and severity of pain and evaluate response  - Implement non-pharmacological measures as appropriate and evaluate response  - Consider cultural and social influences on pain and pain management  - Notify physician/advanced practitioner if interventions unsuccessful or patient reports new pain  Outcome: Progressing     Problem: INFECTION - ADULT  Goal: Absence or prevention of progression during hospitalization  Description: INTERVENTIONS:  - Assess and monitor for signs and symptoms of infection  - Monitor lab/diagnostic results  - Monitor all insertion sites, i.e. indwelling lines, tubes, and drains  - Monitor endotracheal if appropriate and nasal secretions for changes in amount and color  - [place] appropriate cooling/warming therapies per order  - Administer medications as ordered  - Instruct and encourage patient and family to use good hand hygiene technique  - Identify and instruct in appropriate isolation precautions for identified infection/condition  Outcome: Progressing  Goal: Absence of fever/infection during neutropenic period  Description: INTERVENTIONS:  - Monitor WBC    Outcome: Progressing     Problem: SAFETY ADULT  Goal: Patient will remain free of falls  Description: INTERVENTIONS:  - Educate patient/family on patient safety including physical limitations  - Instruct patient to call for assistance with activity   - Consult OT/PT to assist with strengthening/mobility   - Keep Call bell within reach  - Keep bed low and locked with side rails adjusted as appropriate  - Keep care items and personal belongings within reach  - Initiate and maintain comfort rounds  - Make Fall Risk Sign visible to staff  - Offer Toileting every 2 Hours,  in advance of need  - Initiate/Maintain bed alarm  - Obtain necessary fall risk management equipment: call bell within reach   - Apply yellow socks and bracelet for high fall risk patients  - Consider moving patient to room near nurses station  Outcome: Progressing  Goal: Maintain or return to baseline ADL function  Description: INTERVENTIONS:  -  Assess patient's ability to carry out ADLs; assess patient's baseline for ADL function and identify physical deficits which impact ability to perform ADLs (bathing, care of mouth/teeth, toileting, grooming, dressing, etc.)  - Assess/evaluate cause of self-care deficits   - Assess range of motion  - Assess patient's mobility; develop plan if impaired  - Assess patient's need for assistive devices and provide as appropriate  - Encourage maximum independence but intervene and supervise when necessary  - Involve family in performance of ADLs  - Assess for home care needs following discharge   - Consider OT consult to assist with ADL evaluation and planning for discharge  - Provide patient education as appropriate  Outcome: Progressing  Goal: Maintains/Returns to pre admission functional level  Description: INTERVENTIONS:  - Perform AM-PAC 6 Click Basic Mobility/ Daily Activity assessment daily.  - Set and communicate daily mobility goal to care team and patient/family/caregiver.   - Collaborate with rehabilitation services on mobility goals if consulted  - Perform Range of Motion 3 times a day.  - Reposition patient every 2 hours.  - Dangle patient 3 times a day  - Stand patient 3 times a day  - Ambulate patient 3 times a day  - Out of bed to chair 3 times a day   - Out of bed for meals 3 times a day  - Out of bed for toileting  - Record patient progress and toleration of activity level   Outcome: Progressing

## 2024-10-01 NOTE — ASSESSMENT & PLAN NOTE
Patient is documented as having significant tobacco use at baseline.  No diagnosed history of asthma/COPD.  Would question if patient may have an exacerbation of an underlying condition given the above.  We discussed smoking cessation with the patient  Recommend regular nebulizer treatments  Consider steroid taper  Consider outpatient evaluation with pulmonary

## 2024-10-01 NOTE — ASSESSMENT & PLAN NOTE
Patient without any acute signs and symptoms of withdrawal.  Ongoing monitoring as per primary.  Discussed alcohol avoidance while on antibiotic as above.

## 2024-10-01 NOTE — ASSESSMENT & PLAN NOTE
Evident by hypoxia to 88% respirations 22  Has since resolved with IV steroids and IV antibiotics  Now saturating adequately on room air

## 2024-10-01 NOTE — PLAN OF CARE
Problem: PAIN - ADULT  Goal: Verbalizes/displays adequate comfort level or baseline comfort level  Description: Interventions:  - Encourage patient to monitor pain and request assistance  - Assess pain using appropriate pain scale  - Administer analgesics based on type and severity of pain and evaluate response  - Implement non-pharmacological measures as appropriate and evaluate response  - Consider cultural and social influences on pain and pain management  - Notify physician/advanced practitioner if interventions unsuccessful or patient reports new pain  Outcome: Progressing     Problem: INFECTION - ADULT  Goal: Absence or prevention of progression during hospitalization  Description: INTERVENTIONS:  - Assess and monitor for signs and symptoms of infection  - Monitor lab/diagnostic results  - Monitor all insertion sites, i.e. indwelling lines, tubes, and drains  - Monitor endotracheal if appropriate and nasal secretions for changes in amount and color  - [place] appropriate cooling/warming therapies per order  - Administer medications as ordered  - Instruct and encourage patient and family to use good hand hygiene technique  - Identify and instruct in appropriate isolation precautions for identified infection/condition  Outcome: Progressing  Goal: Absence of fever/infection during neutropenic period  Description: INTERVENTIONS:  - Monitor WBC    Outcome: Progressing

## 2024-10-01 NOTE — ASSESSMENT & PLAN NOTE
Presented with pleuritic chest pain and shortness of breath to the ED  CT chest: No evidence of pulmonary embolism. Partial opacification of the bilateral lower lobe bronchi with bronchial wall thickening, associated tree-in-bud opacities and centrilobular nodules. Findings concerning for infectious/inflammatory bronchial airways disease.  Initial procalcitonin is normal will repeat procalcitonin likely suspect could be more of a bronchitis she may benefit more from steroids ordered one-time dose to see patient response  Will be discharged on oral doxycycline  Appreciate ID recommendations

## 2024-10-01 NOTE — CONSULTS
Consultation - Infectious Disease   Name: Gay Fragoso 39 y.o. female I MRN: [id number]  Unit/Bed#: -01 I Date of Admission: 9/29/2024   Date of Service: 10/1/2024 I Hospital Day: 2   Inpatient consult to Infectious Diseases  Consult performed by: Aida Moreno MD  Consult ordered by: RISSA Gabriel        Physician Requesting Evaluation: Baltazar FAY MD   Reason for Evaluation / Principal Problem: Lyme disease      Assessment & Plan  Bronchitis  Patient presented with acute respiratory symptoms and was found to have hypoxia.  CT imaging showing inflammation in the airways but no dense consolidations.  Pro-Reymundo has been negative x 2.  Suspicion overall is for a viral bronchitis with possible exacerbation of underlying airway disease in the setting of chronic tobacco use.  Consider also atypical pneumonia which would also be covered by doxycycline being used to treat #3.  There may also be a component of a developing pericarditis given chest symptoms.  However EKG unremarkable.  Continue doxycycline 100 mg every 12 hours  Plan for 10 days of therapy for #3  Will transition to oral doxycycline today  Drug administration reviewed in detail with the patient  Consider steroid taper  Consider addition of inhaler therapy  Oxygen weaning as per primary  Will check respiratory viral panel for completeness  Trend fever curve/vitals  Repeat CBC/chemistry tomorrow, if admitted  Recommend discussion of smoking cessation  Counseled the patient on avoiding alcohol while on antibiotic  Additional supportive care/discharge as per primary    Hypoxemia requiring supplemental oxygen  Fluctuating oxygen requirements over admission.  Likely related to the above.  Patient otherwise remains hemodynamically stable.  Antibiotic as above  Oxygen weaning per primary  Trend fever curve/vitals  Repeat labs tomorrow, if admitted  Recommend nebulizer treatments  Consider steroid taper  Acute Lyme disease  Patient self-reported a  bite about 3 weeks ago with targetoid lesion that resolved on its own.  IgG and IgM testing is positive.  This may have been contributing to some of patient's flulike symptoms.  EKG otherwise unremarkable.  Labs otherwise stable and so low suspicion for coinfection.   Will continue with doxycycline  Plan for total 10 days of therapy  No plans for test of cure  Drug administration reviewed with the patient  Avoid alcohol use while on antibiotic  Preventative measures discussed.  Continuous chronic alcoholism (HCC)  Patient without any acute signs and symptoms of withdrawal.  Ongoing monitoring as per primary.  Discussed alcohol avoidance while on antibiotic as above.    Tobacco abuse  Patient is documented as having significant tobacco use at baseline.  No diagnosed history of asthma/COPD.  Would question if patient may have an exacerbation of an underlying condition given the above.  We discussed smoking cessation with the patient  Recommend regular nebulizer treatments  Consider steroid taper  Consider outpatient evaluation with pulmonary    Above plan discussed in detail with the patient at bedside  Above plan discussed in detail with primary service advance practitioner who is aware of plans for continued doxycycline, possible needs for steroids and neb treatments and clearance otherwise from an ID standpoint.    Given current plan for therapy, ID consult service will sign off at this time.  Please call if further questions or issues arise.    I have spent a total time of 60 minutes on 10/01/24 in caring for this patient including Diagnostic results, Risks and benefits of tx options, Instructions for management, Risk factor reductions, Impressions, Documenting in the medical record, Reviewing / ordering tests, medicine, procedures  , Obtaining or reviewing history  , and Communicating with other healthcare professionals .     HISTORY OF PRESENT ILLNESS:  HPI: Gay Fragoso is a 39 y.o. year old female with  background alcohol and smoking history.  Is documented to smoking half pack of cigarettes daily and about a 12 pack of beer daily.  No illicit substance use or documented history of COPD/asthma.  Patient reportedly was feeling unwell with initial GI symptoms watery diarrhea, nausea and vomiting which later progressed to respiratory symptoms with chest congestion, malaise and nonproductive cough.  She reported that she had been quite busy over the week and then by the weekend her symptoms compounded prompting her to come into the ER for evaluation.  She was feeling difficulty with breathing along with some pleuritic type chest pain with coughing along with her generalized weakness.  On presentation in the ER she was noted to have hypoxia and was placed on oxygen with improvement.  CTA of the chest was negative for PE but did show some partial opacification of the bilateral lower lobe bronchi with bronchial wall thickening but no definitive dense consolidations on my review of the imaging. Chest x-ray had been unremarkable.  Patient also reported a history of being bit by a tick about 3 weeks prior to presentation reported developing a bull's-eye rash.  She did not seek evaluation immediately and was planning to see her primary and have testing done in a few weeks to see if she ended up being positive for Lyme for treatment.  She never been tested or treated for Lyme in the past.  She does report extensive outdoor exposures involving activities with her children or working at home in her garden.  Labs are grossly otherwise unremarkable and flu and COVID testing had been negative.  She was started on antibiotics both for potential Lyme disease as well as pneumonia.  Patient has been having fluctuating oxygen requirements over this admission and was trialed on steroids.  She reports today that she is still having chest discomfort which seems to be positional.  She reported more still having improvement with steroid as  opposed to doxycycline.  She has been afebrile over admission.  White blood cell count unremarkable.  Pro-Reymundo negative x 2 now.  Blood cultures are without growth at 24 hours.  Vitals have been otherwise stable.  Patient does not have extensive records or admissions in our system.  No recent admissions in Care Everywhere.  She has not been seen by our service previously.  We reviewed in detail possibility of her symptoms being viral in nature but possibly also mixed in the setting of diagnosis of Lyme disease.  We reviewed in detail drug dosing, administration and duration of treatment.  We discussed the need for full glass of water when taking doxycycline, foods that can be taken with doxycycline and avoiding laying down immediately after taking doxycycline. We reviewed serologic testing for Lyme disease and that there is no test of cure.  We discussed preventative measures and repeat testing should she experience another exposure.  We also discussed precautions related to things like West Nile virus.  Reviewed with the patient overall suspicion for need for likely short course of steroids along with doxycycline.  Additional questions answered.  We are consulted at this time for further assistance with management.    REVIEW OF SYSTEMS:  A complete 12 point system-based review of systems is negative other than that noted in the HPI.    PAST MEDICAL HISTORY:  History reviewed. No pertinent past medical history.  Past Surgical History:   Procedure Laterality Date    ADENOIDECTOMY       SECTION  2014    TONSILLECTOMY         FAMILY HISTORY:  Non-contributory    SOCIAL HISTORY:  Social History   Social History     Substance and Sexual Activity   Alcohol Use Not Currently    Comment: pt reports 8 white claws a day     Social History     Substance and Sexual Activity   Drug Use Never     Social History     Tobacco Use   Smoking Status Every Day    Current packs/day: 0.50    Types: Cigarettes   Smokeless Tobacco  Current       ALLERGIES:  No Known Allergies    MEDICATIONS:  All current active medications have been reviewed.    PHYSICAL EXAM:  Temp:  [97.5 °F (36.4 °C)-98 °F (36.7 °C)] 97.5 °F (36.4 °C)  HR:  [53-88] 57  Resp:  [16-18] 18  BP: (111-140)/(64-93) 137/84  SpO2:  [88 %-96 %] 96 %  Temp (24hrs), Av.8 °F (36.6 °C), Min:97.5 °F (36.4 °C), Max:98 °F (36.7 °C)  Current: Temperature: 97.5 °F (36.4 °C)    Intake/Output Summary (Last 24 hours) at 10/1/2024 0918  Last data filed at 10/1/2024 0701  Gross per 24 hour   Intake 360 ml   Output --   Net 360 ml       General Appearance:  Appearing well, nontoxic, and in no distress; patient does have visibly tanned skin.   Head:  Normocephalic, without obvious abnormality, atraumatic   Eyes:  Conjunctiva pink and sclera anicteric, both eyes   Nose: Nares normal, mucosa normal, no drainage   Throat: Oropharynx moist without lesions   Neck: Supple, symmetrical, no adenopathy, no tenderness/mass/nodules   Back:   Symmetric, no curvature, ROM normal, no CVA tenderness   Lungs:   Clear to auscultation bilaterally, respirations unlabored on nasal cannula.  Patient is currently satting at about 96%.   Chest Wall:  No tenderness or deformity; she does have some discomfort when I am palpating more towards the left chest wall.  She also has more discomfort she notices when leaning forward as opposed when leaning back.   Heart:  RRR; no murmur, rub or gallop   Abdomen:   Soft, non-tender, non-distended, positive bowel sounds    Extremities: No cyanosis, clubbing or edema   Skin: No rashes or lesions. No draining wounds noted.   Lymph nodes: Cervical, supraclavicular nodes normal   Neurologic: Alert and oriented times 3, extremity strength 5/5 and symmetric       LABS, IMAGING, & OTHER STUDIES:  In completing this consult I have performed an extensive review of the medical records in epic including review of the notes, radiographs, and laboratory results as detailed below.     Lab  Results:  I have personally reviewed pertinent labs.  Comments/Interpretations: No leukocytosis.  LFTs have been unremarkable.  Hemoglobin stable over the course of admission.    Results from last 7 days   Lab Units 10/01/24  0510 09/30/24  0441 09/29/24  1550   WBC Thousand/uL 4.71 7.05 6.63   HEMOGLOBIN g/dL 14.1 14.2 13.6   PLATELETS Thousands/uL 177 185 166     Results from last 7 days   Lab Units 10/01/24  0510 09/30/24  0441 09/29/24  1550   POTASSIUM mmol/L 4.1 4.1 3.4*   CHLORIDE mmol/L 100 97 102   CO2 mmol/L 33* 28 23   BUN mg/dL 6 10 9   CREATININE mg/dL 0.48* 0.61 0.45*   EGFR ml/min/1.73sq m 123 113 125   CALCIUM mg/dL 9.1 8.6 8.1*   AST U/L 30 43* 64*   ALT U/L 30 34 44   ALK PHOS U/L 57 55 56     Results from last 7 days   Lab Units 09/29/24 2125 09/29/24 2115   BLOOD CULTURE  No Growth at 24 hrs. No Growth at 24 hrs.       Imaging Studies:   I have personally reviewed pertinent imaging study reports and images in PACS.  Comments/Interpretations:  CT chest imaging personally reviewed without any dense infiltrates noted.  Chest x-ray appears clear  EKG reviewed which does not show any signs of heart block or T wave inversions or ST elevations.    Other Studies:   I have personally reviewed other pertinent reports as below.  Records in CareEverywhere: No recent cultures    Nursing home/EMS Records: None    Current/Prior Cultures: Lyme disease both IgG and IgM positive.  Blood cultures without growth.

## 2024-10-01 NOTE — ASSESSMENT & PLAN NOTE
Patient self-reported a bite about 3 weeks ago with targetoid lesion that resolved on its own.  IgG and IgM testing is positive.  This may have been contributing to some of patient's flulike symptoms.  EKG otherwise unremarkable.  Labs otherwise stable and so low suspicion for coinfection.   Will continue with doxycycline  Plan for total 10 days of therapy  No plans for test of cure  Drug administration reviewed with the patient  Avoid alcohol use while on antibiotic  Preventative measures discussed.

## 2024-10-01 NOTE — ASSESSMENT & PLAN NOTE
Patient presented with acute respiratory symptoms and was found to have hypoxia.  CT imaging showing inflammation in the airways but no dense consolidations.  Pro-Reymundo has been negative x 2.  Suspicion overall is for a viral bronchitis with possible exacerbation of underlying airway disease in the setting of chronic tobacco use.  Consider also atypical pneumonia which would also be covered by doxycycline being used to treat #3.  There may also be a component of a developing pericarditis given chest symptoms.  However EKG unremarkable.  Continue doxycycline 100 mg every 12 hours  Plan for 10 days of therapy for #3  Will transition to oral doxycycline today  Drug administration reviewed in detail with the patient  Consider steroid taper  Consider addition of inhaler therapy  Oxygen weaning as per primary  Will check respiratory viral panel for completeness  Trend fever curve/vitals  Repeat CBC/chemistry tomorrow, if admitted  Recommend discussion of smoking cessation  Counseled the patient on avoiding alcohol while on antibiotic  Additional supportive care/discharge as per primary

## 2024-10-01 NOTE — ASSESSMENT & PLAN NOTE
Lyme titers came back positive  Infectious disease consulted low suspicion for pneumonia agree likely more of a bronchitis type setting  Recommend treatment of Lyme's continue Doxy has now been switched from IV to oral recommend 10 days of treatment

## 2024-10-01 NOTE — ASSESSMENT & PLAN NOTE
Fluctuating oxygen requirements over admission.  Likely related to the above.  Patient otherwise remains hemodynamically stable.  Antibiotic as above  Oxygen weaning per primary  Trend fever curve/vitals  Repeat labs tomorrow, if admitted  Recommend nebulizer treatments  Consider steroid taper

## 2024-10-02 VITALS
HEIGHT: 64 IN | BODY MASS INDEX: 23.15 KG/M2 | SYSTOLIC BLOOD PRESSURE: 127 MMHG | HEART RATE: 75 BPM | DIASTOLIC BLOOD PRESSURE: 90 MMHG | TEMPERATURE: 97.5 F | OXYGEN SATURATION: 91 % | RESPIRATION RATE: 19 BRPM | WEIGHT: 135.58 LBS

## 2024-10-02 LAB
ANION GAP SERPL CALCULATED.3IONS-SCNC: 6 MMOL/L (ref 4–13)
BUN SERPL-MCNC: 9 MG/DL (ref 5–25)
CALCIUM SERPL-MCNC: 9 MG/DL (ref 8.4–10.2)
CHLORIDE SERPL-SCNC: 102 MMOL/L (ref 96–108)
CO2 SERPL-SCNC: 29 MMOL/L (ref 21–32)
CREAT SERPL-MCNC: 0.45 MG/DL (ref 0.6–1.3)
ERYTHROCYTE [DISTWIDTH] IN BLOOD BY AUTOMATED COUNT: 12 % (ref 11.6–15.1)
GFR SERPL CREATININE-BSD FRML MDRD: 125 ML/MIN/1.73SQ M
GLUCOSE SERPL-MCNC: 155 MG/DL (ref 65–140)
HCT VFR BLD AUTO: 38.9 % (ref 34.8–46.1)
HGB BLD-MCNC: 13.2 G/DL (ref 11.5–15.4)
MAGNESIUM SERPL-MCNC: 1.6 MG/DL (ref 1.9–2.7)
MCH RBC QN AUTO: 32.3 PG (ref 26.8–34.3)
MCHC RBC AUTO-ENTMCNC: 33.9 G/DL (ref 31.4–37.4)
MCV RBC AUTO: 95 FL (ref 82–98)
PLATELET # BLD AUTO: 170 THOUSANDS/UL (ref 149–390)
PMV BLD AUTO: 9.3 FL (ref 8.9–12.7)
POTASSIUM SERPL-SCNC: 4 MMOL/L (ref 3.5–5.3)
PROINSULIN SERPL-SCNC: 4.1 PMOL/L (ref 0–10)
RBC # BLD AUTO: 4.09 MILLION/UL (ref 3.81–5.12)
SODIUM SERPL-SCNC: 137 MMOL/L (ref 135–147)
WBC # BLD AUTO: 4.74 THOUSAND/UL (ref 4.31–10.16)

## 2024-10-02 PROCEDURE — 85027 COMPLETE CBC AUTOMATED: CPT | Performed by: NURSE PRACTITIONER

## 2024-10-02 PROCEDURE — 99239 HOSP IP/OBS DSCHRG MGMT >30: CPT | Performed by: INTERNAL MEDICINE

## 2024-10-02 PROCEDURE — 80048 BASIC METABOLIC PNL TOTAL CA: CPT | Performed by: NURSE PRACTITIONER

## 2024-10-02 PROCEDURE — 83735 ASSAY OF MAGNESIUM: CPT

## 2024-10-02 RX ORDER — DOXYCYCLINE 100 MG/1
100 CAPSULE ORAL EVERY 12 HOURS SCHEDULED
Qty: 15 CAPSULE | Refills: 0 | Status: SHIPPED | OUTPATIENT
Start: 2024-10-02 | End: 2024-10-10

## 2024-10-02 RX ORDER — LOPERAMIDE HCL 2 MG
2 CAPSULE ORAL ONCE
Status: COMPLETED | OUTPATIENT
Start: 2024-10-02 | End: 2024-10-02

## 2024-10-02 RX ORDER — CALCIUM CARBONATE 500 MG/1
500 TABLET, CHEWABLE ORAL DAILY PRN
Status: DISCONTINUED | OUTPATIENT
Start: 2024-10-02 | End: 2024-10-02 | Stop reason: HOSPADM

## 2024-10-02 RX ORDER — PREDNISONE 50 MG/1
50 TABLET ORAL DAILY
Qty: 5 TABLET | Refills: 0 | Status: SHIPPED | OUTPATIENT
Start: 2024-10-02

## 2024-10-02 RX ORDER — DICYCLOMINE HYDROCHLORIDE 10 MG/1
10 CAPSULE ORAL 3 TIMES DAILY PRN
Qty: 90 CAPSULE | Refills: 0 | Status: SHIPPED | OUTPATIENT
Start: 2024-10-02

## 2024-10-02 RX ORDER — MAGNESIUM SULFATE HEPTAHYDRATE 40 MG/ML
2 INJECTION, SOLUTION INTRAVENOUS ONCE
Status: DISCONTINUED | OUTPATIENT
Start: 2024-10-02 | End: 2024-10-02

## 2024-10-02 RX ORDER — UREA 10 %
500 LOTION (ML) TOPICAL
Qty: 60 TABLET | Refills: 0 | Status: SHIPPED | OUTPATIENT
Start: 2024-10-02

## 2024-10-02 RX ORDER — UREA 10 %
500 LOTION (ML) TOPICAL
Status: DISCONTINUED | OUTPATIENT
Start: 2024-10-02 | End: 2024-10-02 | Stop reason: HOSPADM

## 2024-10-02 RX ORDER — ALBUTEROL SULFATE 90 UG/1
2 INHALANT RESPIRATORY (INHALATION) EVERY 4 HOURS PRN
Status: DISCONTINUED | OUTPATIENT
Start: 2024-10-02 | End: 2024-10-02

## 2024-10-02 RX ADMIN — GUAIFENESIN 600 MG: 600 TABLET ORAL at 09:49

## 2024-10-02 RX ADMIN — FOLIC ACID 1 MG: 1 TABLET ORAL at 09:50

## 2024-10-02 RX ADMIN — NICOTINE 1 PATCH: 14 PATCH, EXTENDED RELEASE TRANSDERMAL at 09:50

## 2024-10-02 RX ADMIN — CHLORDIAZEPOXIDE HYDROCHLORIDE 25 MG: 25 CAPSULE ORAL at 00:30

## 2024-10-02 RX ADMIN — Medication 500 MG: at 10:57

## 2024-10-02 RX ADMIN — DOXYCYCLINE HYCLATE 100 MG: 100 CAPSULE ORAL at 09:49

## 2024-10-02 RX ADMIN — B-COMPLEX W/ C & FOLIC ACID TAB 1 TABLET: TAB at 09:50

## 2024-10-02 RX ADMIN — LOPERAMIDE HYDROCHLORIDE 2 MG: 2 CAPSULE ORAL at 07:21

## 2024-10-02 RX ADMIN — ENOXAPARIN SODIUM 40 MG: 40 INJECTION SUBCUTANEOUS at 09:49

## 2024-10-02 RX ADMIN — CHLORDIAZEPOXIDE HYDROCHLORIDE 25 MG: 25 CAPSULE ORAL at 05:43

## 2024-10-02 RX ADMIN — THIAMINE HCL TAB 100 MG 100 MG: 100 TAB at 09:50

## 2024-10-02 RX ADMIN — CALCIUM CARBONATE (ANTACID) CHEW TAB 500 MG 500 MG: 500 CHEW TAB at 01:59

## 2024-10-02 RX ADMIN — METHYLPREDNISOLONE SODIUM SUCCINATE 40 MG: 40 INJECTION, POWDER, FOR SOLUTION INTRAMUSCULAR; INTRAVENOUS at 05:43

## 2024-10-02 NOTE — PLAN OF CARE
Problem: PAIN - ADULT  Goal: Verbalizes/displays adequate comfort level or baseline comfort level  Description: Interventions:  - Encourage patient to monitor pain and request assistance  - Assess pain using appropriate pain scale  - Administer analgesics based on type and severity of pain and evaluate response  - Implement non-pharmacological measures as appropriate and evaluate response  - Consider cultural and social influences on pain and pain management  - Notify physician/advanced practitioner if interventions unsuccessful or patient reports new pain  Outcome: Progressing     Problem: INFECTION - ADULT  Goal: Absence or prevention of progression during hospitalization  Description: INTERVENTIONS:  - Assess and monitor for signs and symptoms of infection  - Monitor lab/diagnostic results  - Monitor all insertion sites, i.e. indwelling lines, tubes, and drains  - Monitor endotracheal if appropriate and nasal secretions for changes in amount and color  - [place] appropriate cooling/warming therapies per order  - Administer medications as ordered  - Instruct and encourage patient and family to use good hand hygiene technique  - Identify and instruct in appropriate isolation precautions for identified infection/condition  Outcome: Progressing  Goal: Absence of fever/infection during neutropenic period  Description: INTERVENTIONS:  - Monitor WBC    Outcome: Progressing     Problem: SAFETY ADULT  Goal: Patient will remain free of falls  Description: INTERVENTIONS:  - Educate patient/family on patient safety including physical limitations  - Instruct patient to call for assistance with activity   - Consult OT/PT to assist with strengthening/mobility   - Keep Call bell within reach  - Keep bed low and locked with side rails adjusted as appropriate  - Keep care items and personal belongings within reach  - Initiate and maintain comfort rounds  - Make Fall Risk Sign visible to staff  - Offer Toileting every 2 Hours,  in advance of need  - Initiate/Maintain bed alarm  - Obtain necessary fall risk management equipment: call bell within reach  - Apply yellow socks and bracelet for high fall risk patients  - Consider moving patient to room near nurses station  Outcome: Progressing  Goal: Maintain or return to baseline ADL function  Description: INTERVENTIONS:  -  Assess patient's ability to carry out ADLs; assess patient's baseline for ADL function and identify physical deficits which impact ability to perform ADLs (bathing, care of mouth/teeth, toileting, grooming, dressing, etc.)  - Assess/evaluate cause of self-care deficits   - Assess range of motion  - Assess patient's mobility; develop plan if impaired  - Assess patient's need for assistive devices and provide as appropriate  - Encourage maximum independence but intervene and supervise when necessary  - Involve family in performance of ADLs  - Assess for home care needs following discharge   - Consider OT consult to assist with ADL evaluation and planning for discharge  - Provide patient education as appropriate  Outcome: Progressing  Goal: Maintains/Returns to pre admission functional level  Description: INTERVENTIONS:  - Perform AM-PAC 6 Click Basic Mobility/ Daily Activity assessment daily.  - Set and communicate daily mobility goal to care team and patient/family/caregiver.   - Collaborate with rehabilitation services on mobility goals if consulted  - Perform Range of Motion 3 times a day.  - Reposition patient every 2 hours.  - Dangle patient 3 times a day  - Stand patient 3 times a day  - Ambulate patient 3 times a day  - Out of bed to chair 3 times a day   - Out of bed for meals 3 times a day  - Out of bed for toileting  - Record patient progress and toleration of activity level   Outcome: Progressing

## 2024-10-02 NOTE — PLAN OF CARE
Problem: PAIN - ADULT  Goal: Verbalizes/displays adequate comfort level or baseline comfort level  Description: Interventions:  - Encourage patient to monitor pain and request assistance  - Assess pain using appropriate pain scale  - Administer analgesics based on type and severity of pain and evaluate response  - Implement non-pharmacological measures as appropriate and evaluate response  - Consider cultural and social influences on pain and pain management  - Notify physician/advanced practitioner if interventions unsuccessful or patient reports new pain  10/2/2024 1134 by Romina Hankins RN  Outcome: Adequate for Discharge  10/2/2024 1048 by Romina Hankins RN  Outcome: Progressing     Problem: INFECTION - ADULT  Goal: Absence or prevention of progression during hospitalization  Description: INTERVENTIONS:  - Assess and monitor for signs and symptoms of infection  - Monitor lab/diagnostic results  - Monitor all insertion sites, i.e. indwelling lines, tubes, and drains  - Monitor endotracheal if appropriate and nasal secretions for changes in amount and color  - [place] appropriate cooling/warming therapies per order  - Administer medications as ordered  - Instruct and encourage patient and family to use good hand hygiene technique  - Identify and instruct in appropriate isolation precautions for identified infection/condition  10/2/2024 1134 by Romina Hankins RN  Outcome: Adequate for Discharge  10/2/2024 1048 by Romina Hankins RN  Outcome: Progressing  Goal: Absence of fever/infection during neutropenic period  Description: INTERVENTIONS:  - Monitor WBC    10/2/2024 1134 by Romina Hankins RN  Outcome: Adequate for Discharge  10/2/2024 1048 by Romina Hankins RN  Outcome: Progressing     Problem: SAFETY ADULT  Goal: Patient will remain free of falls  Description: INTERVENTIONS:  - Educate patient/family on patient safety including physical limitations  - Instruct patient to call for assistance with activity   - Consult  OT/PT to assist with strengthening/mobility   - Keep Call bell within reach  - Keep bed low and locked with side rails adjusted as appropriate  - Keep care items and personal belongings within reach  - Initiate and maintain comfort rounds  - Make Fall Risk Sign visible to staff  - Offer Toileting every 2 Hours, in advance of need  - Initiate/Maintain bed alarm  - Obtain necessary fall risk management equipment: call bell within reach  - Apply yellow socks and bracelet for high fall risk patients  - Consider moving patient to room near nurses station  10/2/2024 1134 by Romina Hankins RN  Outcome: Adequate for Discharge  10/2/2024 1048 by Romina Hankins RN  Outcome: Progressing  Goal: Maintain or return to baseline ADL function  Description: INTERVENTIONS:  -  Assess patient's ability to carry out ADLs; assess patient's baseline for ADL function and identify physical deficits which impact ability to perform ADLs (bathing, care of mouth/teeth, toileting, grooming, dressing, etc.)  - Assess/evaluate cause of self-care deficits   - Assess range of motion  - Assess patient's mobility; develop plan if impaired  - Assess patient's need for assistive devices and provide as appropriate  - Encourage maximum independence but intervene and supervise when necessary  - Involve family in performance of ADLs  - Assess for home care needs following discharge   - Consider OT consult to assist with ADL evaluation and planning for discharge  - Provide patient education as appropriate  10/2/2024 1134 by Romina Hankins RN  Outcome: Adequate for Discharge  10/2/2024 1048 by Romina Hanknis RN  Outcome: Progressing  Goal: Maintains/Returns to pre admission functional level  Description: INTERVENTIONS:  - Perform AM-PAC 6 Click Basic Mobility/ Daily Activity assessment daily.  - Set and communicate daily mobility goal to care team and patient/family/caregiver.   - Collaborate with rehabilitation services on mobility goals if consulted  - Perform  Range of Motion 3 times a day.  - Reposition patient every 2 hours.  - Dangle patient 3 times a day  - Stand patient 3 times a day  - Ambulate patient 3 times a day  - Out of bed to chair 3 times a day   - Out of bed for meals 3 times a day  - Out of bed for toileting  - Record patient progress and toleration of activity level   10/2/2024 1134 by Romina Hankins RN  Outcome: Adequate for Discharge  10/2/2024 1048 by Romina Hankins RN  Outcome: Progressing     Problem: DISCHARGE PLANNING  Goal: Discharge to home or other facility with appropriate resources  Description: INTERVENTIONS:  - Identify barriers to discharge w/patient and caregiver  - Arrange for needed discharge resources and transportation as appropriate  - Identify discharge learning needs (meds, wound care, etc.)  - Arrange for interpretive services to assist at discharge as needed  - Refer to Case Management Department for coordinating discharge planning if the patient needs post-hospital services based on physician/advanced practitioner order or complex needs related to functional status, cognitive ability, or social support system  10/2/2024 1134 by Romina Hankins, RN  Outcome: Adequate for Discharge  10/2/2024 1048 by Romina Hankins, RN  Outcome: Progressing

## 2024-10-02 NOTE — QUICK NOTE
On rounds, nurse notified of a run of v-tach seen on telemetry, the patient is asymptomatic and reporting diarrhea. Blood work this am does not show an electrolyte abnormality,  magnesium has been added to am blood work. EKG ordered, results are pending. A one time dose of imodium has also been ordered. RN is aware.

## 2024-10-02 NOTE — NURSING NOTE
IV, Masimo and nicotine patch removed. AVS printed and reviewed. Patient offered no questions at this time.

## 2024-10-02 NOTE — DISCHARGE SUMMARY
Discharge Summary - Hospitalist   Name: Gay Fragoso 39 y.o. female I MRN: [id number]  Unit/Bed#: -01 I Date of Admission: 9/29/2024   Date of Service: 10/2/2024 I Hospital Day: 3     Assessment & Plan  CAP (community acquired pneumonia)  Presented with pleuritic chest pain and shortness of breath to the ED  CT chest: No evidence of pulmonary embolism. Partial opacification of the bilateral lower lobe bronchi with bronchial wall thickening, associated tree-in-bud opacities and centrilobular nodules. Findings concerning for infectious/inflammatory bronchial airways disease.  Initial procalcitonin is normal will repeat procalcitonin likely suspect could be more of a bronchitis she may benefit more from steroids ordered one-time dose to see patient response  Will be discharged on oral doxycycline  Appreciate ID recommendations  Continuous chronic alcoholism (HCC)  Patient endorses drinking 12 beers a day  No signs of withdrawal  Will discontinue CIWA protocol  Tobacco abuse  Reports being a half a pack smoker  Cessation strongly encouraged  Continue nicotine patch  Bronchitis  See plan under pneumonia  Discharge on oral doxycycline and oral steroids  Acute Lyme disease  Lyme titers came back positive  Infectious disease consulted low suspicion for pneumonia agree likely more of a bronchitis type setting  Recommend treatment of Lyme's continue Doxy has now been switched from IV to oral recommend 10 days of treatment  Acute respiratory failure with hypoxia (HCC)  Evident by hypoxia to 88% respirations 22  Has since resolved with IV steroids and IV antibiotics  Now saturating adequately on room air       Discharging Physician / Practitioner: Yoni Cerna MD  PCP: No primary care provider on file.  Admission Date:   Admission Orders (From admission, onward)       Ordered        09/29/24 2036  Inpatient Admission  Once            09/29/24 2033  INPATIENT ADMISSION  Once                          Discharge Date:  10/02/24    Medical Problems       Resolved Problems  Date Reviewed: 10/2/2024            Resolved    Hypoglycemia 10/1/2024     Resolved by  RISSA Gabriel    Hypokalemia 10/1/2024     Resolved by  RISSA Gabriel    Tick bite 10/1/2024     Resolved by  RISSA Gabriel          Consultations During Hospital Stay:  ID    Procedures Performed:   none    Significant Findings / Test Results:   CTA ED chest PE study    Result Date: 9/29/2024  Impression: 1. No evidence of pulmonary embolism. 2. Partial opacification of the bilateral lower lobe bronchi with bronchial wall thickening, associated tree-in-bud opacities and centrilobular nodules. Findings concerning for infectious/inflammatory bronchial airways disease. The study was marked in EPIC for immediate notification. Workstation performed: [email or web address]     XR chest 2 views    Result Date: 9/29/2024  Impression: No acute cardiopulmonary disease. Workstation performed: [id number]      Incidental Findings:   none     Test Results Pending at Discharge (will require follow up):   none     Outpatient Tests Requested:  no follow-up needed    Complications:  none    Reason for Admission:      Hospital Course:     Gay Fragoso is a 39 y.o. female patient who originally presented to the hospital on 9/29/2024 with past medical history significant alcohol abuse initially presented with acute respiratory failure with hypoxia secondary to bronchitis.  Also noted to be acute Lyme disease.  Was treated with oral doxycycline with significant improvement.  Able to be weaned off oxygen.  Will be discharged with oral steroids.  Follow-up outpatient with primary care doctorPlease see above list of diagnoses and related plan for additional information.     Condition at Discharge: stable     Discharge Day Visit / Exam:     Subjective: Reports breathing improved.  Denies fevers, chills, cough, abdominal  Vitals: Blood Pressure: 127/90 (10/02/24 0749)  Pulse: 75 (10/02/24  "0749)  Temperature: 97.5 °F (36.4 °C) (10/02/24 0749)  Temp Source: Oral (10/02/24 0723)  Respirations: 19 (10/02/24 0749)  Height: 5' 4\" (162.6 cm) (09/30/24 0406)  Weight - Scale: 61.5 kg (135 lb 9.3 oz) (10/02/24 0600)  SpO2: 91 % (10/02/24 0954)  Exam:   Physical Exam  Constitutional:       General: She is not in acute distress.     Appearance: She is well-developed. She is not diaphoretic.   HENT:      Head: Normocephalic and atraumatic.      Nose: Nose normal.      Mouth/Throat:      Mouth: Oropharynx is clear and moist.      Pharynx: No oropharyngeal exudate.   Eyes:      General: No scleral icterus.        Right eye: No discharge.         Left eye: No discharge.      Extraocular Movements: EOM normal.      Conjunctiva/sclera: Conjunctivae normal.   Neck:      Thyroid: No thyromegaly.      Vascular: No JVD.   Cardiovascular:      Rate and Rhythm: Normal rate and regular rhythm.      Heart sounds: Normal heart sounds. No murmur heard.     No friction rub. No gallop.   Pulmonary:      Effort: Pulmonary effort is normal. No respiratory distress.      Breath sounds: Normal breath sounds. No wheezing or rales.   Chest:      Chest wall: No tenderness.   Abdominal:      General: Bowel sounds are normal. There is no distension.      Palpations: Abdomen is soft.      Tenderness: There is no abdominal tenderness. There is no guarding or rebound.   Musculoskeletal:         General: No tenderness, deformity or edema. Normal range of motion.      Cervical back: Normal range of motion and neck supple.   Skin:     General: Skin is warm and dry.      Findings: No erythema or rash.   Neurological:      Mental Status: She is alert. Mental status is at baseline.      Cranial Nerves: No cranial nerve deficit.      Sensory: No sensory deficit.      Motor: No abnormal muscle tone.      Coordination: Coordination normal.   Psychiatric:         Mood and Affect: Mood and affect normal.         Discussion with Family: pt, declined " family update    Discharge instructions/Information to patient and family:   See after visit summary for information provided to patient and family.      Provisions for Follow-Up Care:  See after visit summary for information related to follow-up care and any pertinent home health orders.      Disposition:     Home    For Discharges to [facility]:   Not Applicable to this Patient - Not Applicable to this Patient    Planned Readmission: none     Discharge Statement:  I spent 60 minutes discharging the patient. This time was spent on the day of discharge. I had direct contact with the patient on the day of discharge. Greater than 50% of the total time was spent examining patient, answering all patient questions, arranging and discussing plan of care with patient as well as directly providing post-discharge instructions.  Additional time then spent on discharge activities.    Discharge Medications:  See after visit summary for reconciled discharge medications provided to patient and family.      ** Please Note: This note has been constructed using a voice recognition system **

## 2024-10-02 NOTE — PLAN OF CARE
Problem: PAIN - ADULT  Goal: Verbalizes/displays adequate comfort level or baseline comfort level  Description: Interventions:  - Encourage patient to monitor pain and request assistance  - Assess pain using appropriate pain scale  - Administer analgesics based on type and severity of pain and evaluate response  - Implement non-pharmacological measures as appropriate and evaluate response  - Consider cultural and social influences on pain and pain management  - Notify physician/advanced practitioner if interventions unsuccessful or patient reports new pain  Outcome: Progressing     Problem: INFECTION - ADULT  Goal: Absence or prevention of progression during hospitalization  Description: INTERVENTIONS:  - Assess and monitor for signs and symptoms of infection  - Monitor lab/diagnostic results  - Monitor all insertion sites, i.e. indwelling lines, tubes, and drains  - Monitor endotracheal if appropriate and nasal secretions for changes in amount and color  - [place] appropriate cooling/warming therapies per order  - Administer medications as ordered  - Instruct and encourage patient and family to use good hand hygiene technique  - Identify and instruct in appropriate isolation precautions for identified infection/condition  Outcome: Progressing  Goal: Absence of fever/infection during neutropenic period  Description: INTERVENTIONS:  - Monitor WBC    Outcome: Progressing     Problem: Knowledge Deficit  Goal: Patient/family/caregiver demonstrates understanding of disease process, treatment plan, medications, and discharge instructions  Description: Complete learning assessment and assess knowledge base.  Interventions:  - Provide teaching at level of understanding  - Provide teaching via preferred learning methods  Outcome: Progressing

## 2024-10-03 NOTE — UTILIZATION REVIEW
NOTIFICATION OF ADMISSION DISCHARGE   This is a Notification of Discharge from [facility]. Please be advised that this patient has been discharge from our facility. Below you will find the admission and discharge date and time including the patient’s disposition.   UTILIZATION REVIEW CONTACT:  Wilma Costello  Utilization   Network Utilization Review Department  Phone: [phone] x carefully listen to the prompts. All voicemails are confidential.  Email: [email or web address]     ADMISSION INFORMATION  PRESENTATION DATE: 9/29/2024  1:36 PM  OBERVATION ADMISSION DATE: N/A  INPATIENT ADMISSION DATE: 9/29/24  8:33 PM   DISCHARGE DATE: 10/2/2024 11:35 AM   DISPOSITION:Home/Self Care    Network Utilization Review Department  ATTENTION: Please call with any questions or concerns to [phone] and carefully listen to the prompts so that you are directed to the right person. All voicemails are confidential.   For Discharge needs, contact Care Management DC Support Team at [phone] opt. 2  Send all requests for admission clinical reviews, approved or denied determinations and any other requests to dedicated fax number below belonging to the campus where the patient is receiving treatment. List of dedicated fax numbers for the Facilities:  FACILITY NAME UR FAX NUMBER   ADMISSION DENIALS (Administrative/Medical Necessity) [phone]   DISCHARGE SUPPORT TEAM ([facility]) [phone]   PARENT CHILD HEALTH (Maternity/NICU/Pediatrics) [phone]   [facility] [phone]   [facility] [phone]   [facility] [phone]   [facility] [phone]   [facility] [phone]   [facility] [phone]   [facility] [phone]   [facility]  [phone]   [facility] [phone]   [facility] [phone]   [facility] [phone]   [facility] [phone]

## 2024-10-05 LAB
BACTERIA BLD CULT: NORMAL
BACTERIA BLD CULT: NORMAL

## 2024-10-31 PROBLEM — J18.9 CAP (COMMUNITY ACQUIRED PNEUMONIA): Status: RESOLVED | Noted: 2024-09-29 | Resolved: 2024-10-31
